# Patient Record
Sex: FEMALE | Race: WHITE
[De-identification: names, ages, dates, MRNs, and addresses within clinical notes are randomized per-mention and may not be internally consistent; named-entity substitution may affect disease eponyms.]

---

## 2017-09-07 NOTE — ED
Altered Mental Status HPI





- General


Chief Complaint: Altered Mental Status


Stated Complaint: fluid on lungs-sent by 


Time Seen by Provider: 09/07/17 16:47


Source: patient


Mode of arrival: wheelchair


Limitations: no limitations





- History of Present Illness


Initial Comments: 





Patient presents with altered mental status.  She has a history of electrolyte 

abnormalities and the past.  She has had problems with her sodium.  Patient has 

no belly or back pain.  She has no chest pain or shortness of breath.  She has 

no lightheadedness, but does feel weak and dizzy.  Nothing makes her symptoms 

better or worse.  She was not doing anything when she began to feel this way.  

She denies injuries.  She has no palpitations.  She has no pain or swelling in 

the legs.





- Related Data


 Home Medications











 Medication  Instructions  Recorded  Confirmed


 


Albuterol Nebulized [Ventolin 2.5 mg INHALATION RT-Q4H PRN 07/08/15 09/07/17





Nebulized]   


 


Losartan [Cozaar] 50 mg PO DAILY 07/08/15 09/07/17


 


OXcarbazepine [Trileptal] 600 mg PO BID 07/08/15 09/07/17


 


Omeprazole [PriLOSEC] 20 mg PO BID 07/08/15 09/07/17


 


ALPRAZolam [Xanax] 0.25 mg PO TID 07/10/15 09/07/17


 


Albuterol Inhaler [Ventolin Hfa 1 - 2 puff INHALATION RT-Q6H PRN 09/07/17 09/07/ 17





Inhaler]   


 


Aspirin EC [Ecotrin] 325 mg PO DAILY 09/07/17 09/07/17


 


Gabapentin 800 mg PO TID 09/07/17 09/07/17


 


Glycopyrrolate/Formoterol Fum 2 puff INHALATION RT-BID 09/07/17 09/07/17





[Bevespi Aerosphere Inhaler]   


 


HYDROmorphone HCL [Dilaudid] 4 mg PO Q4H 09/07/17 09/07/17


 


Melatonin(Unknown Dose) 1 tab PO HS 09/07/17 09/07/17


 


Multivit-Min/Iron/Folic/Lutein 1 tab PO DAILY 09/07/17 09/07/17





[Centrum Silver Women Tablet]   


 


Promethazine 6.25MG/5Ml [Phenergan 6.25 mg PO AC-TID 09/07/17 09/07/17





Syrup]   


 


SILVER sulfADIAZINE Cream 1 applic TOPICAL BID 09/07/17 09/07/17





[Silvadene 1% Cream]   


 


Simvastatin [Zocor] 20 mg PO DAILY 09/07/17 09/07/17


 


Sodium Chloride Tab 1.5 gm PO DAILY 09/07/17 09/07/17


 


guaiFENesin [Mucinex] 600 mg PO Q12H 09/07/17 09/07/17








 Previous Rx's











 Medication  Instructions  Recorded


 


Carvedilol [Coreg*] 25 mg PO BID-W/MEALS #60 tab 07/13/15











 Allergies











Allergy/AdvReac Type Severity Reaction Status Date / Time


 


codeine Allergy  Dyspnea, Verified 09/07/17 16:14





   lip  





   swelling  


 


wheat Allergy  Swelling Verified 09/07/17 16:14














Review of Systems


ROS Statement: 


Those systems with pertinent positive or pertinent negative responses have been 

documented in the HPI.





ROS Other: All systems not noted in ROS Statement are negative.





Past Medical History


Past Medical History: Asthma, Chest Pain / Angina, CVA/TIA, GERD/Reflux, 

Hyperlipidemia, Hypertension, Osteoarthritis (OA), Seizure Disorder


Additional Past Medical History / Comment(s): recent anemia-transfused last 

month, SOB, dizziness, hx. hiatal hernia, herniated discs back, TIA years ago, 

last seizure 8 yrs. ago


History of Any Multi-Drug Resistant Organisms: None Reported


Past Surgical History: Heart Catheterization, Hysterectomy


Additional Past Surgical History / Comment(s): fatty tumor removed back, hole 

in heart repaired w/"patch"


Past Anesthesia/Blood Transfusion Reactions: Postoperative Nausea & Vomiting (

PONV)


Past Psychological History: Depression


Smoking Status: Never smoker


Past Alcohol Use History: None Reported


Past Drug Use History: None Reported





- Past Family History


  ** Mother


Family Medical History: Coronary Artery Disease (CAD)


Additional Family Medical History / Comment(s): heart problems





General Exam


Limitations: no limitations


General appearance: alert, in no apparent distress


Head exam: Present: atraumatic, normocephalic, normal inspection


Eye exam: Present: normal appearance, PERRL, EOMI.  Absent: scleral icterus, 

conjunctival injection, periorbital swelling


ENT exam: Present: normal exam, mucous membranes moist


Neck exam: Present: normal inspection.  Absent: tenderness, meningismus, 

lymphadenopathy


Respiratory exam: Present: normal lung sounds bilaterally.  Absent: respiratory 

distress, wheezes, rales, rhonchi, stridor


Cardiovascular Exam: Present: regular rate, normal rhythm, normal heart sounds.

  Absent: systolic murmur, diastolic murmur, rubs, gallop, clicks


GI/Abdominal exam: Present: soft, normal bowel sounds.  Absent: distended, 

tenderness, guarding, rebound, rigid


Extremities exam: Present: normal inspection, full ROM, normal capillary 

refill.  Absent: tenderness, pedal edema, joint swelling, calf tenderness


Back exam: Present: normal inspection


Neurological exam: Present: alert, oriented X3, CN II-XII intact


Psychiatric exam: Present: normal affect, normal mood


Skin exam: Present: warm, dry, intact, normal color.  Absent: rash





Course





 Vital Signs











  09/07/17 09/07/17 09/07/17





  15:23 16:57 17:39


 


Temperature 98.2 F  


 


Pulse Rate 67 55 L 55 L


 


Respiratory 18 18 18





Rate   


 


Blood Pressure 142/64 154/76 168/77


 


O2 Sat by Pulse 97 100 99





Oximetry   














  09/07/17 09/07/17





  18:04 18:42


 


Temperature  


 


Pulse Rate 53 L 59 L


 


Respiratory 18 18





Rate  


 


Blood Pressure 166/94 162/110


 


O2 Sat by Pulse 98 100





Oximetry  














Medical Decision Making





- Medical Decision Making





patient presents not feeling well.  Examination reveals a very low sodium.  I 

have given her IV normal saline.  She will be admitted to the hospital.





- Lab Data


Result diagrams: 


 09/07/17 17:40





 09/07/17 17:40





 Lab Results











  09/07/17 09/07/17 09/07/17 Range/Units





  16:40 17:40 17:40 


 


WBC   5.3   (3.8-10.6)  k/uL


 


RBC   3.82   (3.80-5.40)  m/uL


 


Hgb   10.1 L   (11.4-16.0)  gm/dL


 


Hct   32.0 L   (34.0-46.0)  %


 


MCV   83.7   (80.0-100.0)  fL


 


MCH   26.5   (25.0-35.0)  pg


 


MCHC   31.7   (31.0-37.0)  g/dL


 


RDW   16.0 H   (11.5-15.5)  %


 


Plt Count   254   (150-450)  k/uL


 


Neutrophils %   63   %


 


Lymphocytes %   23   %


 


Monocytes %   9   %


 


Eosinophils %   3   %


 


Basophils %   0   %


 


Neutrophils #   3.3   (1.3-7.7)  k/uL


 


Lymphocytes #   1.2   (1.0-4.8)  k/uL


 


Monocytes #   0.5   (0-1.0)  k/uL


 


Eosinophils #   0.1   (0-0.7)  k/uL


 


Basophils #   0.0   (0-0.2)  k/uL


 


Hypochromasia   Marked   


 


Anisocytosis   Slight   


 


PT    11.3  (9.0-12.0)  sec


 


INR    1.1  (<1.2)  


 


APTT    25.0  (22.0-30.0)  sec


 


Sodium     (137-145)  mmol/L


 


Potassium     (3.5-5.1)  mmol/L


 


Chloride     ()  mmol/L


 


Carbon Dioxide     (22-30)  mmol/L


 


Anion Gap     mmol/L


 


BUN     (7-17)  mg/dL


 


Creatinine     (0.52-1.04)  mg/dL


 


Est GFR (MDRD) Af Amer     (>60 ml/min/1.73 sqM)  


 


Est GFR (MDRD) Non-Af     (>60 ml/min/1.73 sqM)  


 


Glucose     (74-99)  mg/dL


 


Calcium     (8.4-10.2)  mg/dL


 


Total Bilirubin     (0.2-1.3)  mg/dL


 


AST     (14-36)  U/L


 


ALT     (9-52)  U/L


 


Alkaline Phosphatase     ()  U/L


 


Ammonia     (<30)  umol/L


 


Troponin I     (0.000-0.034)  ng/mL


 


NT-Pro-B Natriuret Pep     pg/mL


 


Total Protein     (6.3-8.2)  g/dL


 


Albumin     (3.5-5.0)  g/dL


 


Urine Color  Yellow    


 


Urine Appearance  Clear    (Clear)  


 


Urine pH  7.0    (5.0-8.0)  


 


Ur Specific Gravity  1.013    (1.001-1.035)  


 


Urine Protein  Negative    (Negative)  


 


Urine Glucose (UA)  Negative    (Negative)  


 


Urine Ketones  Negative    (Negative)  


 


Urine Blood  Negative    (Negative)  


 


Urine Nitrite  Negative    (Negative)  


 


Urine Bilirubin  Negative    (Negative)  


 


Urine Urobilinogen  <2.0    (<2.0)  mg/dL


 


Ur Leukocyte Esterase  Negative    (Negative)  














  09/07/17 09/07/17 09/07/17 Range/Units





  17:40 17:40 17:40 


 


WBC     (3.8-10.6)  k/uL


 


RBC     (3.80-5.40)  m/uL


 


Hgb     (11.4-16.0)  gm/dL


 


Hct     (34.0-46.0)  %


 


MCV     (80.0-100.0)  fL


 


MCH     (25.0-35.0)  pg


 


MCHC     (31.0-37.0)  g/dL


 


RDW     (11.5-15.5)  %


 


Plt Count     (150-450)  k/uL


 


Neutrophils %     %


 


Lymphocytes %     %


 


Monocytes %     %


 


Eosinophils %     %


 


Basophils %     %


 


Neutrophils #     (1.3-7.7)  k/uL


 


Lymphocytes #     (1.0-4.8)  k/uL


 


Monocytes #     (0-1.0)  k/uL


 


Eosinophils #     (0-0.7)  k/uL


 


Basophils #     (0-0.2)  k/uL


 


Hypochromasia     


 


Anisocytosis     


 


PT     (9.0-12.0)  sec


 


INR     (<1.2)  


 


APTT     (22.0-30.0)  sec


 


Sodium  120 L*    (137-145)  mmol/L


 


Potassium  4.5    (3.5-5.1)  mmol/L


 


Chloride  89 L    ()  mmol/L


 


Carbon Dioxide  26    (22-30)  mmol/L


 


Anion Gap  5    mmol/L


 


BUN  6 L    (7-17)  mg/dL


 


Creatinine  0.40 L    (0.52-1.04)  mg/dL


 


Est GFR (MDRD) Af Amer  >60    (>60 ml/min/1.73 sqM)  


 


Est GFR (MDRD) Non-Af  >60    (>60 ml/min/1.73 sqM)  


 


Glucose  90    (74-99)  mg/dL


 


Calcium  8.5    (8.4-10.2)  mg/dL


 


Total Bilirubin  0.4    (0.2-1.3)  mg/dL


 


AST  14    (14-36)  U/L


 


ALT  27    (9-52)  U/L


 


Alkaline Phosphatase  67    ()  U/L


 


Ammonia   <9   (<30)  umol/L


 


Troponin I    <0.012  (0.000-0.034)  ng/mL


 


NT-Pro-B Natriuret Pep     pg/mL


 


Total Protein  5.5 L    (6.3-8.2)  g/dL


 


Albumin  3.4 L    (3.5-5.0)  g/dL


 


Urine Color     


 


Urine Appearance     (Clear)  


 


Urine pH     (5.0-8.0)  


 


Ur Specific Gravity     (1.001-1.035)  


 


Urine Protein     (Negative)  


 


Urine Glucose (UA)     (Negative)  


 


Urine Ketones     (Negative)  


 


Urine Blood     (Negative)  


 


Urine Nitrite     (Negative)  


 


Urine Bilirubin     (Negative)  


 


Urine Urobilinogen     (<2.0)  mg/dL


 


Ur Leukocyte Esterase     (Negative)  














  09/07/17 Range/Units





  17:40 


 


WBC   (3.8-10.6)  k/uL


 


RBC   (3.80-5.40)  m/uL


 


Hgb   (11.4-16.0)  gm/dL


 


Hct   (34.0-46.0)  %


 


MCV   (80.0-100.0)  fL


 


MCH   (25.0-35.0)  pg


 


MCHC   (31.0-37.0)  g/dL


 


RDW   (11.5-15.5)  %


 


Plt Count   (150-450)  k/uL


 


Neutrophils %   %


 


Lymphocytes %   %


 


Monocytes %   %


 


Eosinophils %   %


 


Basophils %   %


 


Neutrophils #   (1.3-7.7)  k/uL


 


Lymphocytes #   (1.0-4.8)  k/uL


 


Monocytes #   (0-1.0)  k/uL


 


Eosinophils #   (0-0.7)  k/uL


 


Basophils #   (0-0.2)  k/uL


 


Hypochromasia   


 


Anisocytosis   


 


PT   (9.0-12.0)  sec


 


INR   (<1.2)  


 


APTT   (22.0-30.0)  sec


 


Sodium   (137-145)  mmol/L


 


Potassium   (3.5-5.1)  mmol/L


 


Chloride   ()  mmol/L


 


Carbon Dioxide   (22-30)  mmol/L


 


Anion Gap   mmol/L


 


BUN   (7-17)  mg/dL


 


Creatinine   (0.52-1.04)  mg/dL


 


Est GFR (MDRD) Af Amer   (>60 ml/min/1.73 sqM)  


 


Est GFR (MDRD) Non-Af   (>60 ml/min/1.73 sqM)  


 


Glucose   (74-99)  mg/dL


 


Calcium   (8.4-10.2)  mg/dL


 


Total Bilirubin   (0.2-1.3)  mg/dL


 


AST   (14-36)  U/L


 


ALT   (9-52)  U/L


 


Alkaline Phosphatase   ()  U/L


 


Ammonia   (<30)  umol/L


 


Troponin I   (0.000-0.034)  ng/mL


 


NT-Pro-B Natriuret Pep  1530  pg/mL


 


Total Protein   (6.3-8.2)  g/dL


 


Albumin   (3.5-5.0)  g/dL


 


Urine Color   


 


Urine Appearance   (Clear)  


 


Urine pH   (5.0-8.0)  


 


Ur Specific Gravity   (1.001-1.035)  


 


Urine Protein   (Negative)  


 


Urine Glucose (UA)   (Negative)  


 


Urine Ketones   (Negative)  


 


Urine Blood   (Negative)  


 


Urine Nitrite   (Negative)  


 


Urine Bilirubin   (Negative)  


 


Urine Urobilinogen   (<2.0)  mg/dL


 


Ur Leukocyte Esterase   (Negative)  














Disposition


Clinical Impression: 


 Hyponatremia





Disposition: ADMITTED AS IP TO THIS HOSP


Condition: Fair


Referrals: 


Duglas Davis MD [Primary Care Provider] - 1-2 days


Time of Disposition: 20:47

## 2017-09-07 NOTE — XR
EXAMINATION TYPE: XR chest 2V

 

DATE OF EXAM: 9/7/2017

 

COMPARISON: 7/7/2015

 

HISTORY: Altered mental status, suspicion for pulmonary edema

 

TECHNIQUE:  Frontal and lateral views of the chest are obtained.

 

FINDINGS:  There is no focal air space opacity, pleural effusion, or pneumothorax seen.  

 

The cardiac silhouette size is moderately enlarged. The thoracic aorta is tortuous. There is evidence
 of a hiatal hernia and this was also seen on the prior study. 

 

The skeletal structures are unremarkable. 

 

IMPRESSION:  

1. No acute cardiopulmonary process.

2. Moderately enlarged cardiac silhouette with tortuous thoracic aorta.

## 2017-09-09 NOTE — P.PN
Progress Note - Text


DATE OF SERVICE: 


09/09/2017





PRESENTING COMPLAINT:


Tired





HISTORY OF PRESENT ILLNESS:


Presented with low sodium, 120 on admission, with episodes of confusion. Has a 

chronic history of low sodium, followed by Dr. Daniels.





INTERVAL HISTORY:


09/09/2017


Patient sitting up in bed, feels tired but better than yesterday.  Sodium 128 

today.  0.9% normal saline added to regimen per nephrology, fluid restriction 

1200 mL's daily remains.  Sodium tabs continue as well.  Patient refused dose 

of Lasix, feels certain she is not supposed to have it.





REVIEW OF SYSTEMS:


Done for constitutional ,cardiovascular, GI, pulmonary with relevant findings 

as above.





CURRENT MEDICATIONS


Lipitor 10 mg by mouth daily, Coreg 25 mg by mouth twice a day, Lasix 20 mg by 

mouth daily, Neurontin and her milligrams by mouth 3 times a day, Cozaar 50 mg 

by mouth daily, Trileptal 600 mg by mouth twice a day





PHYSICAL EXAM


VITAL SIGNS: 


Temp temperature 97.8, pulse 69, respiratory rate 16, blood pressure 145/67, 

oxygen saturation 96% on room air.





GENERAL APPEARANCE: Lying in bed, not in distress. 


EYES: Pupils equal. Conjunctiva normal. 


NECK: JVD not raised. Mass not palpable. 


RESPIRATORY: Respiratory effort normal. Lungs clear to auscultation. 


CARDIOVASCULAR: First and second sounds normal. No edema. 


ABDOMEN: Soft. Liver and spleen not palpable. No tenderness. No mass palpable. 


PSYCHIATRY: Alert and oriented x3. Mood and affect normal. 


NEUROLOGIC: Pupils equal, weakness in both lower extremities.  





INVESTIGATIONS:


Hemoglobin 9.3, sodium 128, potassium 3.9, BUN 3 creatinine 0.43.





ASSESSMENT:


-Hypoosmolar hyponatremia likely SIADH acute on chronic symptomatic.


-Chronic obstructive asthma.


-Chronic congestive heart failure.  Ejection fraction not known.


-Gastroesophageal reflux disease.


-Hyperlipidemia.


-Essential Hypertension


-Primary osteoarthritis of multiple joints.


-History of seizure.


-Hiatal hernia.


-Herniated disc and lower back.


-Depression not otherwise specified.


-Gait dysfunction uses a wheelchair at baseline





PLAN:


Normal saline added per nephrology, continue fluid restriction of 1200 mL daily 

and no free fluid.  We'll discuss with nephrology the continuation of Lasix 

although patient refusing Lasix currently.  Discharge planning for tomorrow 

based on patient condition.  Plan of care discussed with the patient the 

bedside will follow closely.





NP statement: Patient was seen and examined by nurse practitioner Skye Sanchez and all elements of the case discussed with attending  Dr. Tuttle

## 2017-09-09 NOTE — HP
HISTORY AND PHYSICAL



DATE OF ADMISSION:

09/07/2017



PRESENTING COMPLAINT:

Tired.



HISTORY OF PRESENTING COMPLAINT:

This is a 67-year-old patient of Dr. Davis.  The patient pretty much uses a

wheelchair.  Chronic stable medical conditions include CHF, GERD, hyperlipidemia,

hypertension, osteoarthritis, seizures, hiatal hernia, herniated disc and depression.

The patient has had low sodium before and is followed by Dr. Daniels. The patient

presents with episodes of confusion. Sodium was found to be 120 and admitted for the

same. Patient a bit tired, has tolerated diet. Denies any fever.



REVIEW OF SYSTEMS:

CONSTITUTIONAL:  Tired.

HEENT: None.

RESPIRATORY: None.

CARDIOVASCULAR: None.

GASTROINTESTINAL:  None.

GENITOURINARY:  None.

MUSCULOSKELETAL:  Pains in different joints including lower back.

DERMATOLOGIC:  None.

HEMATOLOGIC: None.

LYMPHATIC: None.

PSYCHIATRY: Depression controlled.

NEUROLOGICAL:  No new focal weakness.



PAST MEDICAL HISTORY:

Asthma, congestive heart failure, stroke, GERD, hyperlipidemia, hypertension,

osteoarthritis, seizure, hiatal hernia, herniated disc in the lower back, seizures 8

years ago.



PAST SURGICAL HISTORY:

Cardiac cath, hysterectomy, fatty tumor removal from the back, hole in the heart

repaired with a patch.



SOCIAL HISTORY:

No smoking.  No alcohol.  Uses a wheelchair.



FAMILY HISTORY:

Coronary artery disease.



HOME MEDICATIONS:

1. Abilify 5 mg a day.

2. Mirtazapine 30 mg p.o. q.h.s.

3. Mucinex 600 mg p.o. q.12.

4. Sodium chloride 1.5 g p.o. daily.

5. Zocor 20 mg p.o. daily.

6. Silvadene 1% cream topical b.i.d.

7. Phenergan 6.25 mg p.o. a.c. t.i.d.

8. Prilosec 20 mg p.o. b.i.d.

9. Trileptal 600 mg b.i.d.

10.Multivitamin 1 tab p.o. daily.

11.Melatonin 1 tab p.o. q.h.s.

12.Cozaar 50 mg p.o. daily.

13.Dilaudid 4 mg p.o. q.4.

14._____  2 puffs inhalation b.i.d.

15.Gabapentin 800 mg p.o. t.i.d.

16.Coreg 25 mg p.o. b.i.d.

17.Aspirin 325 p.o. daily.

18.Ventolin 2.5 q.4h p.r.n.

19.Ventolin HFA 1-2 puffs q.6h p.r.n.

20.Xanax 0.25 p.o. t.i.d.



ALLERGIES:

CODEINE AND WHEAT.



EXAMINATION:

Vital signs on presentation: Temperature 98.2, pulse 67, respiration 18, blood pressure

122/64, pulse ox 97% room air.

GENERAL APPEARANCE:  Average built, lying in bed, tired appearing.

EYES: Pupils equal. Conjunctivae normal.

HEENT: Oral cavity normal.

NECK: JVD not raised.  Mass not palpable.

RESPIRATORY: Effort normal. Lungs are clear.

CARDIOVASCULAR:  First and second sounds normal. No edema.

ABDOMEN:  Soft, nontender.  Liver and spleen not palpable.

LYMPHATIC: No lymph node palpable in the neck or axillae.

PSYCHIATRY: Patient able answer simple questions.  Mood and affect normal.

NEUROLOGICAL: Pupils equal. Cranial nerves grossly intact.  Weakness in both lower

extremities.



INVESTIGATIONS:

White count 5.3, hemoglobin 10.1, sodium 120, potassium 4.5, serum osmolality 259.



ASSESSMENT:

1. Hyposmolar hyponatremia likely SIADH acute on chronic symptomatic.

2. _____Obstructive asthma.

3. Chronic congestive heart failure.  Ejection fraction not known.

4. Gastroesophageal reflux disease.

5. Hyperlipidemia.

6. Hypertension.

7. Primary osteoarthritis in multiple joints.

8. History of seizure.

9. Hiatal hernia.

10.Herniated disc in lower back.

11.Depression, not otherwise specified.

12.Gait dysfunction uses a wheelchair at the bedside.



PLAN:

We will increase the sodium intake to 2 g p.o. q.i.d., put the patient on fluid

restriction of 1200 mL and also to avoid free fluids. Will also put her on small dose

of Lasix of 20 mg. Home medication will be resumed. Patient is known to Dr. Goodwin.

Will consult him.  Care was discussed with the patient.  Questions were answered.





MMODL / IJN: [id number] / Job#: [id number]

## 2017-09-09 NOTE — CONS
CONSULTATION



REASON FOR CONSULT:

Hyponatremia.



HISTORY OF PRESENT ILLNESS:

The patient is a 67-year-old female who has a history of chronic hyponatremia.  She

follows with us as outpatient.  Her sodium has been staying at about 129 to 128 as

outpatient.  The patient has been maintained on fluid restriction.  She was admitted to

the hospital with complaints of weakness, not feeling well.  She had not been eating

much.  She also had diarrhea prior to admission.  Serum sodium was noted to be at 120

when patient initially came in.  She is maintained on sodium chloride tabs, which is

quite a high dose at 2 grams q.i.d.  Her sodium level went up to 126 yesterday and this

morning it is at 128.  The patient states overall she is feeling better.  She is not

having any further diarrhea.



PAST MEDICAL HISTORY:

Chronic hyponatremia, hypertension, gastroesophageal reflux disease, asthma,

dyslipidemia, neuropathy, history of CVA/TIA, seizure disorder, depression.



PAST SURGICAL HISTORY:

Cardiac catheterization, hysterectomy, removal of fatty tumor on the back, heart

surgery for repair of defect possibly of VSD, details not clear.



SOCIAL HISTORY:

Negative for smoking, drug abuse or alcohol abuse.



REVIEW OF SYSTEMS:

As per HPI, other systems negative.



MEDICATIONS:

Medications at home included Cozaar, Trileptal, Prilosec, Xanax, inhalers, aspirin,

gabapentin, Zocor, sodium chloride tabs, Mucinex, Coreg.



ALLERGIES:

Allergies include CODEINE, WHEAT.



PHYSICAL EXAMINATION:

On examination, patient is comfortable, awake.  She is not in any acute distress.  She

is alert, oriented x3.  Blood pressure is 145/67, heart rate 68 per minute. Patient is

afebrile.

EXAMINATION OF THE HEART: S1, S2.

EXAMINATION OF THE LUNGS: Bilateral breath sounds are heard.

ABDOMEN:  Soft, nontender.

Examination of the lower extremity shows no significant edema.

CNS exam is grossly intact.



LABS:

Labs show sodium 128, potassium 3.9. Hemoglobin 9.3 grams/dL.



ASSESSMENT:

1. Euvolemic hyponatremia with initially possibly hypovolemic as patient had decreased

    intake and diarrhea prior to admission.  She is maintained on sodium chloride

    tablets, which we will continue.  However, they may be a prerenal component and I

    will challenge her with normal saline.  We will repeat serum sodium in about 3

    hours and proceed from there.  In the meantime, she can continue with the sodium

    chloride tabs, although it is quite a big dose.  The urine osmolality has been

    ordered and it may not be accurate given the high amount of sodium load being taken

    orally.

2. Hypertension, currently controlled.

3. Coronary artery disease with previous history of cardiac catheterization.

4. Congestive heart failure, ejection fraction not known.



PLAN:

Challenge with the normal saline.  Repeat labs in about 3 hours and will proceed from

there.  Continue with the sodium chloride tabs for now.  We may need to decrease the

dose as it is a significantly high dose.



Thank you for this consultation.  We will continue to follow the patient with you

during her hospitalization.





MMODL / IJN: [id number] / Job#: [id number]

## 2017-09-10 NOTE — PN
PROGRESS NOTE



DATE OF SERVICE:

09/10/2017



Patient is seen for followup for hyponatremia.  She was admitted to the hospital with a

serum sodium of 120, it improved to 126 and 128.  The patient's oral sodium chloride

tablets were increased.  I started her on saline yesterday and this morning her serum

sodium is at 129. Patient remains on oral sodium chloride tablets.  However, she is

complaining of mild chest discomfort.  She denies any shortness of breath.  Chest x-ray

on this admission showed no acute cardiopulmonary process.  Her sodium this morning is

up to 129 mEq/L.  The patient denies any other complaints.



EXAMINATION:

Patient is comfortable.  Blood pressure is 137/74, heart rate 67 per minute. She is

afebrile.  Examination of the heart: S1, S2.  Examination lungs: Bilateral breath

sounds are heard.  ABDOMEN:  Soft, nontender.  Exam lower extremity shows edema 1+

bilaterally.  CNS exam is grossly intact.



LAB:

Shows sodium 129, potassium 4.3, BUN 5, serum creatinine 0.4.



ASSESSMENT:

1. Euvolemic hyponatremia with elevated urine osmolality which could also be secondary

    to the high salt intake.  The patient did not worsen with the normal saline.

    However, she does have lower extremity edema.  I will discontinue the saline. We

    may continue with the sodium chloride tablets, but I will decrease the dose to 2 g

    b.i.d.  We will repeat another serum sodium level this evening and patient can most

    likely be discharged tomorrow.  I do not see a cortisol level in her workup, which

    I doubt clinically that there is any underlying adrenal insufficiency, but I will

    order it to complete the workup.

2. Hypertension, controlled.

3. Coronary artery disease with a previous history of cardiac catheterization.



PLAN:

Discontinue normal saline.  Decrease sodium chloride tablets.  Lasix x1 and repeat

chest x-ray and repeat serum sodium this evening.



MMODL / IJN: [id number] / Job#: [id number]

## 2017-09-10 NOTE — XR
EXAMINATION TYPE: XR chest 1V

 

DATE OF EXAM: 9/10/2017

 

COMPARISON: 9/7/2017

 

HISTORY: 67 year-old female shortness of breath, CHF

 

TECHNIQUE: Single frontal view of the chest is obtained.

 

FINDINGS:  

The heart is mildly moderately enlarged. Mild elongation of the thoracic aorta. Diffuse interstitial 
prominence. No consolidation or pleural effusion.

 

IMPRESSION:  

Mild-to-moderate cardiomegaly and interstitial prominence. Mild CHF may be present. No pulmonary kishan
a.

## 2017-09-10 NOTE — PN
PROGRESS NOTE



DATE OF SERVICE:

09/09/2017



ATTENDING NOTE:

This patient seen and examined by me.  I discussed this with my nurse practitioner Ms. Sanchez.  Patient admitted with severe hyponatremia, feeling better.  Sodium has come

up to 128. Being followed by Nephrology.



EXAMINATION:

Blood pressure 137/75.

LUNGS:  Clear.



INVESTIGATION:

Sodium 128.



ASSESSMENT:

Hyposmolar hyponatremia likely SIADH, acute on chronic, slowly improving.



PLAN:

Continue with fluid restriction and free fluid restriction too. Getting IV fluids

ordered by Nephrology. Lasix has been discontinued.  Follow.





MMODL / IJN: [id number] / Job#: [id number]

## 2017-09-10 NOTE — P.PN
Progress Note - Text


DATE OF SERVICE: 


 09/20/2017





PRESENTING COMPLAINT:


Tired





HISTORY OF PRESENT ILLNESS:


Presented with hyponatremia, sodium 120 on admission, with episodes of 

confusion. Has a chronic history of low sodium, followed by Dr. Daniels.





INTERVAL HISTORY:


09/10/2017:


Sitting up at the edge of the bed, eating her breakfast, continues to feel 

tired but better than yesterday, no lethargy, no confusion, no dizziness.  

Serum sodium 129 today.  Normal saline discontinued, sodium chloride tablets 

decreased, Lasix 1 dose with a repeat serum sodium this evening per 

nephrology.  Tolerating her diet eating about 50% of her breakfast, gets around 

in a wheelchair is able to help quite a bit with transfers and getting from 

place to place.  Last BM 09/10/2017.





09/20/2017:


Patient sitting up in bed, feels tired but better than yesterday.  Sodium 128 

today.  0.9% normal saline added to regimen per nephrology, fluid restriction 

1200 mL's daily remains.  Sodium tabs continue as well.  Patient refused dose 

of Lasix, feels certain she is not supposed to have it.





REVIEW OF SYSTEMS:


Done for constitutional ,cardiovascular, GI, pulmonary, neurologic with 

relevant findings as above.





CURRENT MEDICATIONS


Lipitor 10 mg by mouth daily, Coreg 25 mg by mouth twice a day, Lasix 20 mg by 

mouth daily, Neurontin and her milligrams by mouth 3 times a day, Cozaar 50 mg 

by mouth daily, Trileptal 600 mg by mouth twice a day





PHYSICAL EXAM


VITAL SIGNS: 


Temperature 97.0, pulse 67, respirations 16, blood pressure 137/74, oxygen 

saturation 99% on room air.





GENERAL APPEARANCE: Sitting up on the edge of the bed, appears comfortable. 


EYES: Pupils equal. Conjunctiva normal. 


NECK: JVD not raised. Mass not palpable. 


RESPIRATORY: Respiratory effort normal. Lungs clear to auscultation. 


CARDIOVASCULAR: First and second sounds normal. No edema. 


ABDOMEN: Soft. Liver and spleen not palpable. No tenderness. No mass palpable. 


PSYCHIATRY: Alert and oriented x3. Mood and affect normal. 


NEUROLOGIC: Pupils equal, weakness in both lower extremities.  





INVESTIGATIONS:


Sodium 129, BUN 5 creatinine 0.40.





ASSESSMENT:


-Hypoosmolar hyponatremia likely SIADH acute on chronic symptomatic, slowly 

improving


-Chronic obstructive asthma.


-Chronic congestive heart failure.  Ejection fraction not known.


-Gastroesophageal reflux disease.


-Hyperlipidemia.


-Essential Hypertension


-Primary osteoarthritis of multiple joints.


-History of seizure.


-Hiatal hernia.


-Herniated disc and lower back.


-Depression not otherwise specified.


-Gait dysfunction uses a wheelchair at baseline





PLAN:


IV fluids stopped, oral sodium tabs decreased to 2 g, Lasix 20 mg 1 dose given 

repeat labs this evening as well as chest x-ray.  Patient will likely discharge 

tomorrow, will follow closely.  Plan of care discussed with the patient the 

bedside she is in agreement.


NP statement: Patient was seen and examined by nurse practitioner Skye Sanchez and all elements of the case discussed with attending  Dr. Tuttle

## 2017-09-11 NOTE — PN
PROGRESS NOTE



Patient is seen for followup for hyponatremia.  Her serum sodium has improved.  Patient

had 1 dose of Lasix yesterday secondary to increased edema.  The salt tablets were also

decreased.  Patient is actually being discharged today.



PHYSICAL EXAMINATION:

Blood pressure is 148/77, heart rate 63 per minute.  She is afebrile.

EXAMINATION OF THE HEART: S1, S2.

EXAMINATION OF LUNGS: Bilateral breath sounds are heard.

ABDOMEN:  Soft, non-tender.

Examination of lower extremities shows trace edema bilaterally.



LABS:

Serum sodium 132, serum creatinine 0.45.



ASSESSMENT:

1. Euvolemic hyponatremia, currently maintained on sodium chloride tabs, which I will

    decrease. The patient had 1 dose of Lasix yesterday.  We will repeat another dose

    of Lasix today.  She can be discharged on sodium chloride 1 gram b.i.d. We will see

    her for followup as outpatient.  The patient is advised that she may need low-dose

    Lasix depending on her volume status and serum sodium levels as outpatient.

2. Hypertension, possibly related to sodium load, currently improved. I will decrease

    the sodium chloride as well.

3. Chronic obstructive pulmonary disease.



PLAN:

Okay for discharge.  Follow up as outpatient.





MMODL / IJN: [id number] / Job#: [id number]

## 2017-09-11 NOTE — P.DS
Providers


Date of admission: 


09/07/17 20:48





Expected date of discharge: 09/11/17


Attending physician: 


Antonio Tuttle





Consults: 





 





09/08/17 12:07


Consult Physician Routine 


   Consulting Provider: Son Goodwin


   Consult Reason/Comments: low sodium


   Do you want consulting provider notified?: Yes











Primary care physician: 


Duglas HAMMOND [facility] Course: 








FINAL DIAGNOSES:


-Hypoosmolar hyponatremia likely SIADH acute on chronic symptomatic, slowly 

improving


-Chronic obstructive asthma.


-Chronic congestive heart failure.  Ejection fraction not known.


-Gastroesophageal reflux disease.


-Hyperlipidemia.


-Essential Hypertension


-Primary osteoarthritis of multiple joints.


-History of seizure.


-Hiatal hernia.


-Herniated disc and lower back.


-Depression not otherwise specified.


-Gait dysfunction uses a wheelchair at baseline





HOSPTIAL COURSE:


67-year-old female with a chronic history of low sodium followed by Dr. Daniels, 

presented with acute confusion sodium was found to be 120 admitted for the 

same.  Nephrology was consulted, continued sodium tabs, gave her normal saline 

challenge jose labs, and a urine osmolality.  Patient developed lower extremity 

edema due to the normal saline challenge, normal saline was discontinued, given 

a dose of IV Lasix to eliminate the excess fluid, sodium tabs increased to 2 g 

twice a day with more labs to be drawn to evaluate progress.  Sodium continued 

to improve, today 132, sodium tablets readjusted to her home dosing.  Patient's 

confusion has dissipated completely, sitting up in the bed, alert and very 

talkative.  Tolerating her diet eating 50-75% of each meal, at baseline patient 

uses a wheelchair to get about, and can do that with some standby assistance to 

get into the chair, last BM was 09/10/2017.  Overall condition has improved, 

cleared by consultants and is appropriate for discharge home.








PHYSICAL EXAM: 





CARDIOVASCULAR: First and second sounds noted mild edema to feet and ankles





RESPIRATORY: Effort normal lung sounds diminished bilaterally





MUSKULOSKELETAL: Lower extremity weakness, chronic, uses a wheelchair at 

baseline





PSYCHIATRY: Alert and oriented 3 mood and affect normal.





Patient was seen and examined by nurse practitioner Skye Sanchez in all 

elements of the case discussed with attending Dr. Tuttle


DISPOSITION: Discharge home to the care of her .








Patient Condition at Discharge: Fair





Plan - Discharge Summary


New Discharge Prescriptions: 


New


   Aspirin 81 mg PO DAILY #1 chewable


   Furosemide [Lasix] 20 mg PO DAILY #30 tab





Continue


   Omeprazole [PriLOSEC] 20 mg PO BID


   OXcarbazepine [Trileptal] 600 mg PO BID


   Losartan [Cozaar] 50 mg PO DAILY


   Albuterol Nebulized [Ventolin Nebulized] 2.5 mg INHALATION RT-Q4H PRN


     PRN Reason: Shortness Of Breath


   ALPRAZolam [Xanax] 0.25 mg PO TID


   Carvedilol [Coreg*] 25 mg PO BID-W/MEALS #60 tab


   guaiFENesin [Mucinex] 600 mg PO Q12H


   SILVER sulfADIAZINE Cream [Silvadene 1% Cream] 1 applic TOPICAL BID


   Melatonin(Unknown Dose) 1 tab PO HS


   Promethazine 6.25MG/5Ml [Phenergan Syrup] 6.25 mg PO AC-TID


   Gabapentin 800 mg PO TID


   HYDROmorphone HCL [Dilaudid] 4 mg PO Q4H


   Glycopyrrolate/Formoterol Fum [Bevespi Aerosphere Inhaler] 2 puff INHALATION 

RT-BID


   Simvastatin [Zocor] 20 mg PO DAILY


   Multivit-Min/Iron/Folic/Lutein [Centrum Silver Women Tablet] 1 tab PO DAILY


   Albuterol Inhaler [Ventolin Hfa Inhaler] 1 - 2 puff INHALATION RT-Q6H PRN


     PRN Reason: Shortness Of Breath


   Mirtazapine 30 mg PO HS


   ARIPiprazole [Abilify] 5 mg PO QAM





Changed


   Sodium Chloride Tab 1.5 gm PO BID #60 





Discontinued


   Aspirin EC [Ecotrin] 325 mg PO DAILY


Discharge Medication List





Albuterol Nebulized [Ventolin Nebulized] 2.5 mg INHALATION RT-Q4H PRN 07/08/15 [

History]


Losartan [Cozaar] 50 mg PO DAILY 07/08/15 [History]


OXcarbazepine [Trileptal] 600 mg PO BID 07/08/15 [History]


Omeprazole [PriLOSEC] 20 mg PO BID 07/08/15 [History]


ALPRAZolam [Xanax] 0.25 mg PO TID 07/10/15 [History]


Carvedilol [Coreg*] 25 mg PO BID-W/MEALS #60 tab 07/13/15 [Rx]


ARIPiprazole [Abilify] 5 mg PO QAM 09/07/17 [History]


Albuterol Inhaler [Ventolin Hfa Inhaler] 1 - 2 puff INHALATION RT-Q6H PRN 09/07/ 17 [History]


Gabapentin 800 mg PO TID 09/07/17 [History]


Glycopyrrolate/Formoterol Fum [Bevespi Aerosphere Inhaler] 2 puff INHALATION RT-

BID 09/07/17 [History]


HYDROmorphone HCL [Dilaudid] 4 mg PO Q4H 09/07/17 [History]


Melatonin(Unknown Dose) 1 tab PO HS 09/07/17 [History]


Mirtazapine 30 mg PO HS 09/07/17 [History]


Multivit-Min/Iron/Folic/Lutein [Centrum Silver Women Tablet] 1 tab PO DAILY 09/ 07/17 [History]


Promethazine 6.25MG/5Ml [Phenergan Syrup] 6.25 mg PO AC-TID 09/07/17 [History]


SILVER sulfADIAZINE Cream [Silvadene 1% Cream] 1 applic TOPICAL BID 09/07/17 [

History]


Simvastatin [Zocor] 20 mg PO DAILY 09/07/17 [History]


guaiFENesin [Mucinex] 600 mg PO Q12H 09/07/17 [History]


Aspirin 81 mg PO DAILY #1 chewable 09/11/17 [Rx]


Furosemide [Lasix] 20 mg PO DAILY #30 tab 09/11/17 [Rx]


Sodium Chloride Tab 1.5 gm PO BID #60  09/11/17 [Rx]








Follow up Appointment(s)/Referral(s): 


Aleksandra Daniels MD [STAFF PHYSICIAN] - 1 Week (Dr. Daniels's office will call 

patient with a follow up appointment. )


Duglas Davis MD [Primary Care Provider] - 09/12/17 2:00 pm


Ambulatory/Diagnostic Orders: 


Basic Metabolic Panel [LAB.AMB] Location: Determined By Patient


Basic Metabolic Panel [LAB.AMB] Location: Determined By Patient


Patient Instructions/Handouts:  Furosemide (By mouth), Aspirin (By mouth), 

Sodium Chloride (By mouth), Hyponatremia (DC)


Activity/Diet/Wound Care/Special Instructions: 


residential home care - 





Diet: Heart Healthy


Activity: as tolerated





* Sodium Level redrawn in three days*


Discharge Disposition: HOME SELF-CARE

## 2017-09-13 NOTE — DS
DISCHARGE SUMMARY



ADDENDUM:



DATE OF DISCHARGE:

September 11, 2017.



ATTENDING NOTE:

This patient was seen and examined by me.  I discussed with my nurse practitioner, Ms. Sanchez.



EXAM:

Lungs decreased breath sounds. Cardiovascular first and second sounds normal.



The patient's sodium is 132.



ASSESSMENT:

Syndrome of inappropriate antidiuretic hormone  with severe hypoosmolar hyponatremia,

improving.



PLAN:

Patient will be discharged.



DISCHARGE MEDICATION:

Will include:

1. Lasix 20 mg.

2. Sodium chloride tab will be 1.5 mg p.o. b.i.d.

Fluid restriction to continue.  The patient to follow up with Dr. Daniels and labs as an

outpatient.  Discharge planning more than 35 minutes.





MMODL / IJN: [id number] / Job#: [id number]

## 2017-09-14 NOTE — ED
Chest Pain HPI





- General


Chief Complaint: Chest Pain


Stated Complaint: HTN


Time Seen by Provider: 17 22:23


Source: patient


Mode of arrival: ambulatory





- History of Present Illness


Initial Comments: 





This patient is a 67-year-old woman who presents to be evaluate for chest pain 

that is been going on since this afternoon, around 4 PM.  The patient states 

that she has for the most for been resting all day as she is not feeling back 

to her usual self yet from her hospital admission.  The patient had been 

admitted here with she states congestive heart failure and hyponatremia.  She 

was discharged 3 days ago.  Patient states that in addition to the chest pain 

she states that when she was getting ready for bed tonight she noticed her 

blood pressure was in the neighborhood of 190/100.  She states this really 

concerned her so she took a dose of carvedilol and came here to be evaluated.  

Patient denies any anginal type symptoms, including no dyspnea, diaphoresis, 

palpitations, lightheadedness or syncope, abdominal pain, nausea or vomiting.


MD Complaint: chest pain


Onset/Timin


-: hour(s)


Onset: during rest


Pain Location: substernal


Pain Radiation: none


Quality: dull


Consistency: constant


Improves With: nothing


Worsens With: nothing


Treatments Prior to Arrival: none





- Related Data


 Home Medications











 Medication  Instructions  Recorded  Confirmed


 


Albuterol Nebulized [Ventolin 2.5 mg INHALATION RT-Q4H PRN 07/08/15 09/14/17





Nebulized]   


 


Losartan [Cozaar] 50 mg PO DAILY 07/08/15 09/14/17


 


OXcarbazepine [Trileptal] 600 mg PO BID 07/08/15 09/14/17


 


Omeprazole [PriLOSEC] 20 mg PO BID 07/08/15 09/14/17


 


ALPRAZolam [Xanax] 0.25 mg PO TID 07/10/15 09/14/17


 


ARIPiprazole [Abilify] 5 mg PO QAM 17


 


Albuterol Inhaler [Ventolin Hfa 1 - 2 puff INHALATION RT-Q6H PRN 17





Inhaler]   


 


Gabapentin 800 mg PO TID 17


 


Glycopyrrolate/Formoterol Fum 2 puff INHALATION RT-BID 17





[Bevespi Aerosphere Inhaler]   


 


HYDROmorphone HCL [Dilaudid] 4 mg PO Q4H 17


 


Mirtazapine 30 mg PO DAILY@17


 


Promethazine 6.25MG/5Ml [Phenergan 6.25 mg PO AC-TID 17





Syrup]   


 


SILVER sulfADIAZINE Cream 1 applic TOPICAL BID 17





[Silvadene 1% Cream]   


 


Simvastatin [Zocor] 20 mg PO DAILY 17


 


guaiFENesin [Mucinex] 600 mg PO Q12H 17


 


Carvedilol [Coreg] 25 mg PO BID-W/MEALS 17


 


Melatonin 3 mg PO HS 17


 


Multivitamins, Thera [Multivitamin 1 tab PO DAILY 17





(formulary)]   








 Previous Rx's











 Medication  Instructions  Recorded


 


Aspirin 81 mg PO DAILY #1 chewable 17


 


Furosemide [Lasix] 20 mg PO DAILY #30 tab 17


 


Sodium Chloride Tab 1.5 gm PO BID #60  17











 Allergies











Allergy/AdvReac Type Severity Reaction Status Date / Time


 


codeine Allergy  Dyspnea, Verified 17 23:18





   Lip  





   Swelling  


 


wheat Allergy  Swelling Verified 17 22:07














Review of Systems


ROS Statement: 


Those systems with pertinent positive or pertinent negative responses have been 

documented in the HPI.





ROS Other: All systems not noted in ROS Statement are negative.


Constitutional: Denies: fever, chills


Respiratory: Denies: cough, dyspnea, wheezes


Cardiovascular: Reports: chest pain, edema (Chronic).  Denies: palpitations, 

dyspnea on exertion, syncope


Gastrointestinal: Denies: abdominal pain, nausea, vomiting


Genitourinary: Denies: dysuria, hematuria


Musculoskeletal: Denies: back pain


Skin: Denies: rash


Neurological: Denies: headache, weakness, numbness





Past Medical History


Past Medical History: Asthma, Chest Pain / Angina, Heart Failure, CVA/TIA, GERD/

Reflux, Hyperlipidemia, Hypertension, Osteoarthritis (OA), Seizure Disorder


Additional Past Medical History / Comment(s): recent anemia-transfused last 

month, SOB, dizziness, hx. hiatal hernia, herniated discs back, TIA years ago, 

last seizure 8 yrs. ago


History of Any Multi-Drug Resistant Organisms: None Reported


Past Surgical History: Heart Catheterization, Hysterectomy


Additional Past Surgical History / Comment(s): fatty tumor removed back, hole 

in heart repaired w/"patch"


Past Anesthesia/Blood Transfusion Reactions: Postoperative Nausea & Vomiting (

PONV)


Past Psychological History: Depression


Smoking Status: Never smoker


Past Alcohol Use History: None Reported


Past Drug Use History: None Reported





- Past Family History


  ** Mother


Family Medical History: Coronary Artery Disease (CAD)


Additional Family Medical History / Comment(s): heart problems





General Exam


General appearance: alert, in no apparent distress, obese


Head exam: Present: atraumatic, normocephalic


Eye exam: Present: normal appearance.  Absent: scleral icterus, conjunctival 

injection


Neck exam: Present: normal inspection, full ROM


Respiratory exam: Present: rales (Bilateral bases).  Absent: normal lung sounds 

bilaterally, respiratory distress, wheezes, rhonchi, stridor


Cardiovascular Exam: Present: regular rate, normal rhythm, normal heart sounds.

  Absent: systolic murmur, diastolic murmur, rubs, gallop


GI/Abdominal exam: Present: soft.  Absent: distended, tenderness, guarding, 

rebound, mass


Extremities exam: Present: normal inspection, normal capillary refill.  Absent: 

pedal edema, calf tenderness


Back exam: Present: normal inspection.  Absent: CVA tenderness (R), CVA 

tenderness (L)


Neurological exam: Present: alert


Skin exam: Present: warm, dry, intact, normal color.  Absent: rash





Course


 Vital Signs











  09/14/17 09/14/17 09/15/17





  22:04 23:24 00:04


 


Temperature 97.3 F L  


 


Pulse Rate  59 L 61


 


Pulse Rate [   





Right Pulse   





Oximetery]   


 


Respiratory 65 H 18 18





Rate   


 


Blood Pressure 207/103 219/98 203/95


 


Blood Pressure   





[Left Arm]   


 


O2 Sat by Pulse 98 100 100





Oximetry   














  09/15/17 09/15/17 09/15/17





  00:56 00:57 01:45


 


Temperature  97.7 F 


 


Pulse Rate 56 L  53 L


 


Pulse Rate [   





Right Pulse   





Oximetery]   


 


Respiratory 18  18





Rate   


 


Blood Pressure 199/84  196/81


 


Blood Pressure   





[Left Arm]   


 


O2 Sat by Pulse 100  100





Oximetry   














  09/15/17 09/15/17 09/15/17





  02:25 02:39 03:23


 


Temperature   98.1 F


 


Pulse Rate 52 L 57 L 


 


Pulse Rate [   55 L





Right Pulse   





Oximetery]   


 


Respiratory 18 18 18





Rate   


 


Blood Pressure 200/87 189/81 


 


Blood Pressure   184/91





[Left Arm]   


 


O2 Sat by Pulse 100 100 98





Oximetry   














  09/15/17 09/15/17 09/15/17





  04:00 04:44 04:54


 


Temperature   


 


Pulse Rate  55 L 56 L


 


Pulse Rate [ 51 L  





Right Pulse   





Oximetery]   


 


Respiratory 18  





Rate   


 


Blood Pressure   


 


Blood Pressure   





[Left Arm]   


 


O2 Sat by Pulse   





Oximetry   














Disposition


Clinical Impression: 


 Chest pain, Hyponatremia





Disposition: ADMITTED AS IP TO THIS HOSP


Condition: Poor


Referrals: 


Duglas Davis MD [Primary Care Provider] - 1-2 days

## 2017-09-14 NOTE — XR
EXAM:

  XR Chest, 1 View

 

CLINICAL HISTORY:

  Reason: chest pain

 

TECHNIQUE:

  Frontal view of the chest.

 

COMPARISON:

  Chest x-ray 9/10/17

 

FINDINGS:

  Lungs:  Stable mild central pulmonary vascular congestion.  No focal 

consolidation.

  Pleural space:  No pleural effusion.  No pneumothorax.

  Heart:  Stable cardiomegaly.

  Mediastinum:  Unremarkable.

 

IMPRESSION:     

  Stable cardiomegaly and mild central pulmonary vascular congestion.

## 2017-09-15 NOTE — CT
EXAM:

  CT Angiography Chest With Intravenous Contrast

 

CLINICAL HISTORY:

  Reason: Pain

 

TECHNIQUE:

  Axial computed tomographic angiography images of the chest with 

intravenous contrast using pulmonary embolism protocol.  Coronal and 

sagittal reformats were obtained.  CTDI is 142.40 mGy and DLP is 653.30 

mGy-cm.  This CT exam was performed using one or more of the following 

dose reduction techniques: automated exposure control, adjustment of the 

mA and/or kV according to patient size, and/or use of iterative 

reconstruction technique.

  MIP reconstructed images were created and reviewed.

 

COMPARISON:

  Chest x-ray 9/14/17

 

FINDINGS:

  Pulmonary arteries:  No pulmonary embolism detected.

  Aorta:  Fusiform ascending thoracic aortic aneurysm, 4.2 cm in diameter.

  No evidence of rupture.

  Lungs:  Mild interlobular septal thickening, suggesting mild pulmonary 

edema.  Reflux of contrast into the IVC and hepatic veins, also 

suggesting right heart congestion.  Posterior right lower lobe opacities, 

likely atelectasis but correlate for pneumonia.  Several areas of mucous 

filling within bilateral lower lobe bronchi.

  Pleural space:  No significant effusion.  No pneumothorax.

  Heart:  Moderate calcified atherosclerosis of the coronary arteries.  

Mild cardiomegaly.  No significant pericardial effusion.    Mediastinum:  

Moderate sized mixed paraesophageal and sliding hiatal hernia.

  Bones/joints:  Superior endplate compression deformity of the L1 

vertebral body, likely chronic, but correlate for focal tenderness.  

Several old left-sided rib fractures.  

  Lymph nodes:  No enlarged lymph nodes.

  Liver:  Hepatic cyst.

 

IMPRESSION:     

1.  No pulmonary embolism detected.

 

2.  Mild cardiogenic pulmonary edema.

 

3.  Fusiform ascending thoracic aortic aneurysm, 4.2 cm in diameter.

 

4.  Posterior right lower lobe opacities, favor atelectasis over 

pneumonia.

 

5.  Superior endplate compression deformity of the L1 vertebral body, 

likely chronic but correlate for focal tenderness.

 

6.  Moderate sized mixed paraesophageal and sliding hiatal hernia.

## 2017-09-15 NOTE — ECHOF
Referral Reason:chest pain



MEASUREMENTS

--------

HEIGHT: 165.1 cm

WEIGHT: 88.9 kg

BP: 157/96

IVSd:   1.2 cm     (0.6 - 1.1)

LVIDd:   4.5 cm     (3.9 - 5.3)

LVPWd:   1.5 cm     (0.6 - 1.1)

IVSs:   2.0 cm

LVIDs:   3.2 cm

LVPWs:   1.8 cm

LAESV Index (A-L):   56.23 ml/m

Ao Diam:   3.1 cm     (2.0 - 3.7)

AV Cusp:   2.3 cm     (1.5 - 2.6)

LA Diam:   3.7 cm     (2.7 - 3.8)

MV EXCURSION:   15.618 mm     (> 18.000)

MV EF SLOPE:   57 mm/s     (70 - 150)

EPSS:   1.5 cm

MV E Oz:   0.84 m/s

MV DecT:   248 ms

MV A Oz:   0.70 m/s

MV E/A Ratio:   1.21 

RAP:   5.00 mmHg

RVSP:   8.21 mmHg







FINDINGS

--------

Sinus rhythm.

This was a technically good study.

The left ventricular size is normal.   There is mild 

concentric left ventricular hypertrophy.   Overall left 

ventricular systolic function is normal with, an EF 

between 55 - 60 %.

The right ventricle is normal in size and function.

LA is severely dilated >40 ml/m2

The right atrium is normal in size.

The aortic valve is trileaflet, and appears 

structurally normal. No aortic stenosis or 

regurgitation.

The mitral valve leaflets are mildly thickened.   

Moderate mitral regurgitation is present.

Mild tricuspid regurgitation present.   The right 

ventricular systolic pressure, as measured by Doppler, 

is 8.21mmHg.

Pulmonic valve appears structurally normal.

The aortic root size is normal.

The inferior vena cava is mildly dilated.

The pericardium is normal.



CONCLUSIONS

--------

1. Sinus rhythm.

2. The mitral valve leaflets are mildly thickened.

3. Moderate mitral regurgitation is present.

4. Mild tricuspid regurgitation present.

5. The right ventricular systolic pressure, as measured by 

Doppler, is 8.21mmHg.

6. Pulmonic valve appears structurally normal.

7. The aortic root size is normal.

8. The inferior vena cava is mildly dilated.

9. The pericardium is normal.

10. This was a technically good study.

11. The left ventricular size is normal.

12. There is mild concentric left ventricular hypertrophy.

13. Overall left ventricular systolic function is normal 

with, an EF between 55 - 60 %.

14. The right ventricle is normal in size and function.

15. LA is severely dilated >40 ml/m2

16. The right atrium is normal in size.

17. The aortic valve is trileaflet, and appears 

structurally normal. No aortic stenosis or 

regurgitation.





SONOGRAPHER: Mikala Verdugo RDCS

## 2017-09-15 NOTE — P.HPIM
History of Present Illness


62-year-old female came in with complaints of chest pressure-like sensation, 

patient was a valid by cardiology and the patient was believed to have 

noncardiac chest pain etiology is of which is not clear at this point of time 

although her chest pain is significantly better compared to her admission.  

Patient was also comparing of shortness of breath did not give any clear 

history of orthopnea PND patient does have history of asthma as well as chronic 

diastolic dysfunction patient underwent CT angios the chest which did show up  

a thoracic aortic in his him along with some pulmonary edema probably secondary 

to chronic diastolic dysfunction with acute exacerbation patient used to be on 

Lasix in the past which was discontinued secondary to her hyponatremia.  Her 

hyponatremia was believed to be secondary to syndrome of inappropriate ADH 

secretion and was on salt tablets which were discontinued because of elevated 

blood pressure right now her hyponatremia is believed secondary to hypovolemia 

and the nephrology is recommending 20 mg of Lasix which she was given and will 

monitor sodium.  Patient has very good air entry bilateral lung fields without 

any wheezing because of which I do not believe patient has asthma exacerbation 

at this time.  Patient was started on new antihypertensive medications although 

her blood pressure significantly dropped because of which will continue her 

home antidepressive medications except for an increase in losartan and monitor 

her blood pressures tonight.








Review of Systems


REVIEW OF SYSTEMS: 


CONSTITUTIONAL: No fever, no malaise, no fatigue. 


HEENT: No recent visual problems or hearing problems. Denied any sore throat. 


CARDIOVASCULAR: no palpitations, no syncope. 


PULMONARY: As mentioned in HPI


GASTROINTESTINAL: No diarrhea, no nausea, no vomiting, no abdominal pain. 

Normoactive bowel sounds. 


NEUROLOGICAL: No headaches, no weakness, no numbness. 


HEMATOLOGICAL: Denies any bleeding or petechiae. 


GENITOURINARY: Denies any burning micturition, frequency, or urgency. 


MUSCULOSKELETAL/RHEUMATOLOGICAL: Denies any joint pain, swelling, or any muscle 

pain. 


ENDOCRINE: Denies any polyuria or polydipsia. 





The rest of the 14-point review of systems is negative.











Past Medical History


Past Medical History: Asthma, Chest Pain / Angina, Heart Failure, CVA/TIA, GERD/

Reflux, Hyperlipidemia, Hypertension, Osteoarthritis (OA), Seizure Disorder


Additional Past Medical History / Comment(s): recent anemia-transfused last 

month, SOB, dizziness, hx. hiatal hernia, herniated discs back, TIA years ago, 

last seizure 8 yrs. ago


History of Any Multi-Drug Resistant Organisms: None Reported


Past Surgical History: Heart Catheterization, Hysterectomy


Additional Past Surgical History / Comment(s): fatty tumor removed back, hole 

in heart repaired w/"patch"


Past Anesthesia/Blood Transfusion Reactions: Postoperative Nausea & Vomiting (

PONV)


Past Psychological History: Depression


Smoking Status: Never smoker


Past Alcohol Use History: None Reported


Past Drug Use History: None Reported





- Past Family History


  ** Mother


Family Medical History: Coronary Artery Disease (CAD)


Additional Family Medical History / Comment(s): heart problems





Medications and Allergies


 Home Medications











 Medication  Instructions  Recorded  Confirmed  Type


 


Albuterol Nebulized [Ventolin 2.5 mg INHALATION RT-Q4H PRN 07/08/15 09/14/17 

History





Nebulized]    


 


Losartan [Cozaar] 50 mg PO DAILY 07/08/15 09/14/17 History


 


OXcarbazepine [Trileptal] 600 mg PO BID 07/08/15 09/14/17 History


 


Omeprazole [PriLOSEC] 20 mg PO BID 07/08/15 09/14/17 History


 


ALPRAZolam [Xanax] 0.25 mg PO TID 07/10/15 09/14/17 History


 


ARIPiprazole [Abilify] 5 mg PO QAM 09/07/17 09/14/17 History


 


Albuterol Inhaler [Ventolin Hfa 1 - 2 puff INHALATION RT-Q6H PRN 09/07/17 09/14/ 17 History





Inhaler]    


 


Gabapentin 800 mg PO TID 09/07/17 09/14/17 History


 


Glycopyrrolate/Formoterol Fum 2 puff INHALATION RT-BID 09/07/17 09/14/17 History





[Bevespi Aerosphere Inhaler]    


 


HYDROmorphone HCL [Dilaudid] 4 mg PO Q4H 09/07/17 09/14/17 History


 


Mirtazapine 30 mg PO DAILY@2000 09/07/17 09/14/17 History


 


Promethazine 6.25MG/5Ml [Phenergan 6.25 mg PO AC-TID 09/07/17 09/14/17 History





Syrup]    


 


SILVER sulfADIAZINE Cream 1 applic TOPICAL BID 09/07/17 09/14/17 History





[Silvadene 1% Cream]    


 


Simvastatin [Zocor] 20 mg PO DAILY 09/07/17 09/14/17 History


 


guaiFENesin [Mucinex] 600 mg PO Q12H 09/07/17 09/14/17 History


 


Aspirin 81 mg PO DAILY #1 chewable 09/11/17 09/14/17 Rx


 


Furosemide [Lasix] 20 mg PO DAILY #30 tab 09/11/17 09/14/17 Rx


 


Sodium Chloride Tab 1.5 gm PO BID #60  09/11/17 09/14/17 Rx


 


Carvedilol [Coreg] 25 mg PO BID-W/MEALS 09/14/17 09/14/17 History


 


Melatonin 3 mg PO HS 09/14/17 09/14/17 History


 


Multivitamins, Thera [Multivitamin 1 tab PO DAILY 09/14/17 09/14/17 History





(formulary)]    











 Allergies











Allergy/AdvReac Type Severity Reaction Status Date / Time


 


codeine Allergy  Dyspnea, Verified 09/14/17 23:18





   Lip  





   Swelling  


 


wheat Allergy  Swelling Verified 09/14/17 22:07














Physical Exam


Vitals: 


 Vital Signs











  Temp Pulse Pulse Resp BP BP BP


 


 09/15/17 12:05   62     


 


 09/15/17 11:55   60     


 


 09/15/17 11:38  98.2 F   58 L  18   109/66 


 


 09/15/17 07:45  97.6 F   56 L  18    157/96


 


 09/15/17 04:54   56 L     


 


 09/15/17 04:44   55 L     


 


 09/15/17 04:00    51 L  18   


 


 09/15/17 03:23  98.1 F   55 L  18   184/91 


 


 09/15/17 02:39   57 L   18  189/81  


 


 09/15/17 02:25   52 L   18  200/87  


 


 09/15/17 01:45   53 L   18  196/81  


 


 09/15/17 00:57  97.7 F      


 


 09/15/17 00:56   56 L   18  199/84  


 


 09/15/17 00:04   61   18  203/95  


 


 09/14/17 23:24   59 L   18  219/98  


 


 09/14/17 22:04  97.3 F L    65 H  207/103  














  Pulse Ox


 


 09/15/17 12:05 


 


 09/15/17 11:55 


 


 09/15/17 11:38  98


 


 09/15/17 07:45  99


 


 09/15/17 04:54 


 


 09/15/17 04:44 


 


 09/15/17 04:00 


 


 09/15/17 03:23  98


 


 09/15/17 02:39  100


 


 09/15/17 02:25  100


 


 09/15/17 01:45  100


 


 09/15/17 00:57 


 


 09/15/17 00:56  100


 


 09/15/17 00:04  100


 


 09/14/17 23:24  100


 


 09/14/17 22:04  98








 Intake and Output











 09/15/17 09/15/17 09/15/17





 06:59 14:59 22:59


 


Intake Total  100 


 


Balance  100 


 


Intake:   


 


  Oral  100 


 


Other:   


 


  Voiding Method Bedpan Bedpan 





  Diaper 


 


  # Voids 1 1 


 


  Weight 88.904 kg  











PHYSICAL EXAMINATION: 





GENERAL: The patient is alert and oriented x3, not in any acute distress. Well 

developed, well nourished. 


HEENT: Pupils are round and equally reacting to light. EOMI. No scleral 

icterus. No conjunctival pallor. Normocephalic, atraumatic. No pharyngeal 

erythema. No thyromegaly. 


CARDIOVASCULAR: S1 and S2 present. No murmurs, rubs, or gallops. 


PULMONARY: Chest is clear to auscultation, no wheezing or crackles. 


ABDOMEN: Soft, nontender, nondistended, normoactive bowel sounds. No palpable 

organomegaly. 


MUSCULOSKELETAL: No joint swelling or deformity.


EXTREMITIES: No cyanosis, clubbing, or pedal edema. 


NEUROLOGICAL: Gross neurological examination did not reveal any focal deficits. 


SKIN: No rashes. 











Results


CBC & Chem 7: 


 09/14/17 22:41





 09/15/17 13:11


Labs: 


 Abnormal Lab Results - Last 24 Hours (Table)











  09/14/17 09/14/17 09/14/17 Range/Units





  22:41 22:41 22:41 


 


Hgb  10.8 L    (11.4-16.0)  gm/dL


 


RDW  16.0 H    (11.5-15.5)  %


 


D-Dimer    0.60 H  (<0.60)  mg/L FEU


 


Sodium   128 L   (137-145)  mmol/L


 


Chloride   94 L   ()  mmol/L


 


BUN   6 L   (7-17)  mg/dL


 


Creatinine   0.48 L   (0.52-1.04)  mg/dL


 


Total Protein   6.2 L   (6.3-8.2)  g/dL














  09/15/17 Range/Units





  13:11 


 


Hgb   (11.4-16.0)  gm/dL


 


RDW   (11.5-15.5)  %


 


D-Dimer   (<0.60)  mg/L FEU


 


Sodium  130 L  (137-145)  mmol/L


 


Chloride   ()  mmol/L


 


BUN   (7-17)  mg/dL


 


Creatinine   (0.52-1.04)  mg/dL


 


Total Protein   (6.3-8.2)  g/dL














Thrombosis Risk Factor Assmnt





- Choose All That Apply


Each Factor Represents 1 point: Abnormal pulmonary function (COPD), Obesity (

BMI >25)


Each Risk Factor Represents 2 Points: Age 61-74 years


Thrombosis Risk Factor Assessment Total Risk Factor Score: 4


Thrombosis Risk Factor Assessment Level: Moderate Risk





Assessment and Plan


Plan: 


#1 chest pain: Rule out acute coronary syndromes patient has atypical chest 

pain etiology of which is unclear at this time.  Was evaluated by cardiology.


#2 hypovolemic hyponatremia: Patient will continue on Lasix and give her a dose 

of IV Lasix. 


 #3 congestive heart failure chronic diastolic dysfunction with acute 

exacerbation Lasix merriment as mentioned above.  And repeat lites and kidney 

function tomorrow.


#4 hypertension: Elevated blood pressures are secondary to salt tablets, 

patient will be continued on her home regimen along with increasing losartan 

from 5200 monitor blood pressures.


#4 hyperlipidemia 


 #5 gastric residual reflux disease


#6 primary osteoarthritis


 #7 chronic low back pain 


 #8 depression

## 2017-09-15 NOTE — P.NPCON
History of Present Illness





- Reason for Consult


hyponatremia





- History of Present Illness





Reason for consultation: Hyponatremia





History of present illness:


Patient is a 67-year-old female seen in renal consultation for hyponatremia.  

She was recently admitted here earlier this month and her sodium level was as 

low as 120 as of 09/07/2017.  During that admission she received salt tabs as 

well as Lasix in sodium level gradually improved.  Patient developed chest pain 

which started a few hours prior to admission and came back to the hospital.  

She also noticed her blood pressure was in the systolic 190s and was even 

higher when she presented to the hospital.  Her blood pressure medications were 

adjusted by cardiology and blood pressure is now well controlled.  She denies 

any diaphoresis.  Denies any vomiting or diarrhea.  Denies palpitations.  No 

syncopal episodes.  She did have a CTA done this admission which revealed a 4.2 

cm thoracic aortic aneurysm as well as mild pulmonary edema.  No evidence of 

pulmonary embolus.  Echocardiogram done and revealed moderate mitral 

regurgitation along with preserved ejection fraction.  Admits to good urine 

output.  No hematuria or dysuria.  She is tolerating oral intake well.  States 

she watches her fluid intake and tries are limited to less than 40 ounces daily.





Vital signs are stable.


General: The patient appeared well nourished and normally developed. 


HEENT: Head exam is unremarkable. Neck is without jugular venous distension.


LUNGS: Lungs are clear to auscultation and percussion. Breath sounds decreased.


HEART: Rate and Rhythm are regular. First and second heart sounds normal. No 

murmurs, rubs or gallops. 


ABDOMEN: Abdominal exam reveals normal bowel sounds. Non-tender and non-

distended. No evidence of peritonitis.


EXTREMITITES: No clubbing, cyanosis, or edema.





Past Medical History


Past Medical History: Asthma, Chest Pain / Angina, Heart Failure, CVA/TIA, GERD/

Reflux, Hyperlipidemia, Hypertension, Osteoarthritis (OA), Seizure Disorder


Additional Past Medical History / Comment(s): recent anemia-transfused last 

month, SOB, dizziness, hx. hiatal hernia, herniated discs back, TIA years ago, 

last seizure 8 yrs. ago


History of Any Multi-Drug Resistant Organisms: None Reported


Past Surgical History: Heart Catheterization, Hysterectomy


Additional Past Surgical History / Comment(s): fatty tumor removed back, hole 

in heart repaired w/"patch"


Past Anesthesia/Blood Transfusion Reactions: Postoperative Nausea & Vomiting (

PONV)


Past Psychological History: Depression


Smoking Status: Never smoker


Past Alcohol Use History: None Reported


Past Drug Use History: None Reported





- Past Family History


  ** Mother


Family Medical History: Coronary Artery Disease (CAD)


Additional Family Medical History / Comment(s): heart problems





Medications and Allergies


 Home Medications











 Medication  Instructions  Recorded  Confirmed  Type


 


Albuterol Nebulized [Ventolin 2.5 mg INHALATION RT-Q4H PRN 07/08/15 09/14/17 

History





Nebulized]    


 


Losartan [Cozaar] 50 mg PO DAILY 07/08/15 09/14/17 History


 


OXcarbazepine [Trileptal] 600 mg PO BID 07/08/15 09/14/17 History


 


Omeprazole [PriLOSEC] 20 mg PO BID 07/08/15 09/14/17 History


 


ALPRAZolam [Xanax] 0.25 mg PO TID 07/10/15 09/14/17 History


 


ARIPiprazole [Abilify] 5 mg PO QAM 09/07/17 09/14/17 History


 


Albuterol Inhaler [Ventolin Hfa 1 - 2 puff INHALATION RT-Q6H PRN 09/07/17 09/14/ 17 History





Inhaler]    


 


Gabapentin 800 mg PO TID 09/07/17 09/14/17 History


 


Glycopyrrolate/Formoterol Fum 2 puff INHALATION RT-BID 09/07/17 09/14/17 History





[Bevespi Aerosphere Inhaler]    


 


HYDROmorphone HCL [Dilaudid] 4 mg PO Q4H 09/07/17 09/14/17 History


 


Mirtazapine 30 mg PO DAILY@2000 09/07/17 09/14/17 History


 


Promethazine 6.25MG/5Ml [Phenergan 6.25 mg PO AC-TID 09/07/17 09/14/17 History





Syrup]    


 


SILVER sulfADIAZINE Cream 1 applic TOPICAL BID 09/07/17 09/14/17 History





[Silvadene 1% Cream]    


 


Simvastatin [Zocor] 20 mg PO DAILY 09/07/17 09/14/17 History


 


guaiFENesin [Mucinex] 600 mg PO Q12H 09/07/17 09/14/17 History


 


Aspirin 81 mg PO DAILY #1 chewable 09/11/17 09/14/17 Rx


 


Furosemide [Lasix] 20 mg PO DAILY #30 tab 09/11/17 09/14/17 Rx


 


Sodium Chloride Tab 1.5 gm PO BID #60  09/11/17 09/14/17 Rx


 


Carvedilol [Coreg] 25 mg PO BID-W/MEALS 09/14/17 09/14/17 History


 


Melatonin 3 mg PO HS 09/14/17 09/14/17 History


 


Multivitamins, Thera [Multivitamin 1 tab PO DAILY 09/14/17 09/14/17 History





(formulary)]    











 Allergies











Allergy/AdvReac Type Severity Reaction Status Date / Time


 


codeine Allergy  Dyspnea, Verified 09/14/17 23:18





   Lip  





   Swelling  


 


wheat Allergy  Swelling Verified 09/14/17 22:07














Physical Exam


Vitals: 


 Vital Signs











  Temp Pulse Pulse Resp BP BP BP


 


 09/15/17 12:05   62     


 


 09/15/17 11:55   60     


 


 09/15/17 11:38  98.2 F   58 L  18   109/66 


 


 09/15/17 07:45  97.6 F   56 L  18    157/96


 


 09/15/17 04:54   56 L     


 


 09/15/17 04:44   55 L     


 


 09/15/17 04:00    51 L  18   


 


 09/15/17 03:23  98.1 F   55 L  18   184/91 


 


 09/15/17 02:39   57 L   18  189/81  


 


 09/15/17 02:25   52 L   18  200/87  


 


 09/15/17 01:45   53 L   18  196/81  


 


 09/15/17 00:57  97.7 F      


 


 09/15/17 00:56   56 L   18  199/84  


 


 09/15/17 00:04   61   18  203/95  


 


 09/14/17 23:24   59 L   18  219/98  


 


 09/14/17 22:04  97.3 F L    65 H  207/103  














  Pulse Ox


 


 09/15/17 12:05 


 


 09/15/17 11:55 


 


 09/15/17 11:38  98


 


 09/15/17 07:45  99


 


 09/15/17 04:54 


 


 09/15/17 04:44 


 


 09/15/17 04:00 


 


 09/15/17 03:23  98


 


 09/15/17 02:39  100


 


 09/15/17 02:25  100


 


 09/15/17 01:45  100


 


 09/15/17 00:57 


 


 09/15/17 00:56  100


 


 09/15/17 00:04  100


 


 09/14/17 23:24  100


 


 09/14/17 22:04  98








 Intake and Output











 09/14/17 09/15/17 09/15/17





 22:59 06:59 14:59


 


Other:   


 


  Voiding Method  Bedpan Bedpan





   Diaper


 


  # Voids  1 1


 


  Weight 88.904 kg 88.904 kg 














Results





- Lab Results


 Most recent lab results











Calcium  9.0 mg/dL (8.4-10.2)   09/14/17  22:41    


 


Magnesium  1.6 mg/dL (1.6-2.3)   09/14/17  22:41    














 09/14/17 22:41





 09/14/17 22:41





Assessment and Plan


Plan: 





Assessment:


#1.  Hyponatremia, slightly hypervolemic in nature as evidenced by mild 

pulmonary edema on CTA.  However also component of SIADH particularly from 

Trileptal and Remeron.  Sodium level 128.recent TSH was normal.  Uric acid 

level was low which can be seen in SIADH due to increased uric acid clearance.


#2.  4.2 cm thoracic aortic aneurysm.


#3.  Benign hypertension.  Now controlled.





Plan:


Continue Lasix 20 mg once daily.


I strongly advised to follow a low-salt and 50 oz fluid restricted diet daily.


I will decrease the dose of salt tabs to 1 g twice daily - this will need to be 

decreased further depending on her volume status and her sodium level.


She will need to get BMP checked within 3-4 days of discharge and follow-up as 

an outpatient in the next 2 weeks.


May also need to consider alternatives for Trileptal and Remeron.





Thank you for the consultation.  I will continue to follow the patient with you 

during her hospital stay.

## 2017-09-15 NOTE — CONS
CONSULTATION



Mrs. Flores is a 67-year-old lady with a known history of previous surgical repair for

ASD performed several years ago.  She also has a history of hypertension, a recent

cardiac cath 2 years ago, which revealed no significant obstructive CAD.  She was in

the hospital a few days ago for hyponatremia and COPD with exacerbation.  She went home

and after 3 days yesterday she was feeling more short of breath and she felt queasy,

tired, and also had a sensation of nausea and had sharp pains across the chest and her

 took her blood pressure which was 190/100, and they brought her to the

hospital.  After arrival, she was hypertensive, but blood pressure is better today.

She has no further chest pain. Shortness of breath has also improved.  Troponins are

normal.  BNP has not been ordered.  EKG revealed a sinus mechanism with mild IVCD.  No

acute changes.  Sodium today is 128.  She is resting comfortably without significant

symptoms.



PAST MEDICAL HISTORY:

1. Status post surgical repair for ASD.

2. Cardiac cath performed in 2015, did not reveal any significant obstructive CAD.

    Ejection fraction was in the range of 50% last in 2 years ago.  She carries a

    diagnosis of nonischemic cardiomyopathy, but her ejection fraction has come back to

    the 50% range or better.  She has a codominant system with a 30% to 40% mid RCA

    narrowing.  No other significant disease was noted.  Ejection fraction was 50%.

    This was based on a cardiac cath that was performed in July 2015.

3. This patient also has history of recurrent hyponatremia, uncontrolled hypertension

    and also history of some diastolic heart failure as well.



MEDICATIONS:

Medications at home include Lasix 20 mg daily, simvastatin 20 mg daily, losartan 50 mg

daily, Coreg 25 mg b.i.d., aspirin 81 mg daily.  She also takes Abilify, Xanax,

Mucinex, and Dilaudid.



ALLERGIES:

She is allergic to CODEINE.



PHYSICAL EXAMINATION:

On examination, the blood pressure has improved to 157/96, pulse rate is 60 per minute,

regular.

HEENT: Unremarkable.  Fundus was not examined by me.

Neck is supple.  There is JVD of 1 cm.  There is no carotid bruit.

Heart exam reveals S1, S2 heard normally.  No significant murmurs are detected.

Lungs reveal mild expiratory rhonchi.  There is no evidence of any rales.

Abdomen is soft and nontender.

Lower extremities revealed no significant edema.  Pulses are palpable.

Central nervous system was grossly within normal limits.



LABORATORY DATA:

Laboratory data suggests that troponins are normal.  D-dimer was 0.60.  Patient had a

CT angio which revealed no evidence of pulmonary embolism, but there was a 4.2 cm

dilatation of the ascending aorta.



IMPRESSION:

1. Mild exacerbation of probable reactive airway disease rather than chronic

    obstructive pulmonary disease.  Patient apparently is not a smoker.

2. History of mild diastolic dysfunction with mild heart failure.

3. Hyponatremia, which is sodium in the range of 128.

4. Uncontrolled hypertension.

5. Hyperlipidemia.

6. Status post surgical repair for ASD without significant coronary artery disease

    based on a cardiac cath 2 years ago.



RECOMMENDATIONS:

I am recommending echocardiogram, BNP level, 1 dose of Lasix, fluid restriction, add

clonidine and Norvasc 5 mg b.i.d., obtain echocardiogram.  Once we optimize her blood

pressure control, she can possibly be discharged tomorrow and see Dr. DANNIE Lane in

the office.  I am recommending that she should also have a pulmonary evaluation and

will request Dr. Rust to see the patient today.  She probably has some reactive

airway disease.  From a cardiac standpoint, no aggressive intervention is advised.  For

her 4.2 cm ascending aortic aneurysm, optimal BP control and noninvasive followup is

advised.



Thank you very much for the consult.





MMODL / IJN: [id number] / Job#: [id number]

## 2017-09-16 NOTE — P.PN
Subjective





Patient seen in follow-up for hyponatremia.  Sodium level is 129 this morning.  

She did have an episode of tonic-clonic seizure that was witnessed by the 

nurses.  She is maintained on anticonvulsants.  Currently she is resting in 

bed.  She is awake and alert.  Slightly confused.  No vomiting or diarrhea.  

Hemodynamically stable.





Vital signs are stable.


General: The patient appeared well nourished and normally developed. 


HEENT: Head exam is unremarkable. Neck is without jugular venous distension.


LUNGS: Lungs are clear to auscultation and percussion. Breath sounds decreased.


HEART: Rate and Rhythm are regular. First and second heart sounds normal. No 

murmurs, rubs or gallops. 


ABDOMEN: Abdominal exam reveals normal bowel sounds. Non-tender and non-

distended. No evidence of peritonitis.


EXTREMITITES: No clubbing, cyanosis, or edema.





Objective





- Vital Signs


Vital signs: 


 Vital Signs











Temp  98 F   09/16/17 16:00


 


Pulse  66   09/16/17 16:00


 


Resp  15   09/16/17 16:00


 


BP  157/86   09/16/17 16:00


 


Pulse Ox  99   09/16/17 16:00








 Intake & Output











 09/15/17 09/16/17 09/16/17





 18:59 06:59 18:59


 


Intake Total 340 120 735


 


Output Total   425


 


Balance 340 120 310


 


Intake:   


 


  Oral 340 120 735


 


Output:   


 


  Urine   425


 


Other:   


 


  Voiding Method Bedpan  Bedside Commode





 Diaper  Bedpan





   Diaper


 


  # Voids 1 1 1


 


  # Bowel Movements   1














- Labs


CBC & Chem 7: 


 09/14/17 22:41





 09/16/17 06:28


Labs: 


 Abnormal Lab Results - Last 24 Hours (Table)











  09/16/17 09/16/17 Range/Units





  06:28 16:51 


 


Sodium  129 L   (137-145)  mmol/L


 


Chloride  97 L   ()  mmol/L


 


Creatinine  0.47 L   (0.52-1.04)  mg/dL


 


POC Glucose (mg/dL)   126 H  (75-99)  mg/dL


 


Calcium  8.3 L   (8.4-10.2)  mg/dL


 


HDL Cholesterol  65 H   (40-60)  mg/dL














Assessment and Plan


Plan: 





Assessment:


#1.  Hyponatremia, slightly hypervolemic in nature as evidenced by mild 

pulmonary edema on CTA.  However also component of SIADH particularly from 

Trileptal and Remeron.  Sodium level 129. Recent TSH was normal.  Uric acid 

level was low which can be seen in SIADH due to increased uric acid clearance.


#2.  4.2 cm thoracic aortic aneurysm.


#3.  Benign hypertension.  Now controlled.


#4.  Seizures.  Brain CT benign.





Plan:


Continue Lasix 20 mg once daily.


I strongly advised to follow a low-salt and 50 oz fluid restricted diet daily.


Continue salt tabs 1 g twice daily - this will need to be decreased further 

depending on her volume status and her sodium level.


May also need to consider alternatives for Trileptal and Remeron.


Continue current antihypertensives - Hydralazine has been added as well.


Neurology recommendations pending.

## 2017-09-16 NOTE — CT
EXAMINATION TYPE: CT brain wo con

 

DATE OF EXAM: 9/16/2017

 

COMPARISON: 2/26/2011

 

HISTORY: SEIZURE AND HYPONATREMIA.

 

CT DLP: 981.7 mGycm.  Automated Exposure Control for Dose Reduction was Utilized.

 

 

TECHNIQUE: CT scan of the head is performed without contrast.

 

FINDINGS:   There is no acute intracranial hemorrhage or midline shift identified. There is diffuse v
entricular and sulcal prominence consistent with diffuse age-related cerebral atrophy.  There is low-
attenuation in the periventricular white matter consistent with chronic small vessel ischemic change.
  The globes are intact and the visualized sinuses are clear.   Pituitary appears within normal limit
s of size.

 

IMPRESSION:  No acute intracranial hemorrhage, mass effect, or midline shift is seen. No evidence of 
herniation or CT evidence of cerebral edema in this patient with seizures and hyponatremia. Pituitary
 does not appear enlarged.

## 2017-09-16 NOTE — P.PN
Subjective





This is a pleasant 67-year-old  female patient with a past medical 

history significant for hypertension and status post ASD repair who presented 

to the hospital with chest discomfort that shortness of breath.





On arrival, the patient was hypertensive.  She also was found to be 

hyponatremic.





The patient was diagnosed with mild congestive heart failure exacerbation and 

she was started on a small dose of Lasix.  Also the blood pressure medications 

were adjusted.





On follow-up with her today, the pressure overall is controlled except for in 

the morning which was elevated. 


She still have mild I lateral lower extremity pitting edema noted.  She 

continues to be on Lasix at 20 mg PO daily.





Objective





- Vital Signs


Vital signs: 


 Vital Signs











Temp  98.1 F   09/16/17 08:00


 


Pulse  63   09/16/17 08:00


 


Resp  16   09/16/17 08:00


 


BP  171/85   09/16/17 08:00


 


Pulse Ox  95   09/16/17 08:00








 Intake & Output











 09/15/17 09/16/17 09/16/17





 18:59 06:59 18:59


 


Intake Total 340 120 240


 


Output Total   225


 


Balance 340 120 15


 


Intake:   


 


  Oral 340 120 240


 


Output:   


 


  Urine   225


 


Other:   


 


  Voiding Method Bedpan  Bedpan





 Diaper  Diaper


 


  # Voids 1 1 1


 


  # Bowel Movements   1














- Constitutional


General appearance: Present: no acute distress





- Respiratory


Respiratory: bilateral: wheezing





- Cardiovascular


Rhythm: regular


Heart sounds: normal: S1, S2


Abnormal Heart Sounds: Present: systolic murmur





- Labs


CBC & Chem 7: 


 09/14/17 22:41





 09/16/17 06:28


Labs: 


 Abnormal Lab Results - Last 24 Hours (Table)











  09/15/17 09/16/17 Range/Units





  13:11 06:28 


 


Sodium  130 L  129 L  (137-145)  mmol/L


 


Chloride   97 L  ()  mmol/L


 


Creatinine   0.47 L  (0.52-1.04)  mg/dL


 


Calcium   8.3 L  (8.4-10.2)  mg/dL


 


HDL Cholesterol   65 H  (40-60)  mg/dL














Assessment and Plan


Plan: 





At this point, I am going to add hydralazine to the current medical treatment 

for better blood pressure control.  Also continue the Lasix by mouth.  Continue 

monitor the kidney function and electrolytes.

## 2017-09-16 NOTE — P.PN
Subjective


Patient was admitted for chest pain which appeared to be [place] skeletal patient 

also has hyponatremia from a hypervolemic hyponatremia for which patient is on 

oral Lasix at this time.  Patient is also being treated for asthma 

exacerbation.  Patient had tonic-clonic seizure today for which patient is 

being a valid by neurology and the CT of the head without contrast is being 

obtained to rule out any acute hemorrhage.





Patient denied any fever, chills, chest pain, nausea, vomiting, shortness of 

breath today








Objective





- Vital Signs


Vital signs: 


 Vital Signs











Temp  97.9 F   09/16/17 12:00


 


Pulse  60   09/16/17 15:15


 


Resp  16   09/16/17 12:00


 


BP  161/86   09/16/17 13:07


 


Pulse Ox  97   09/16/17 12:00








 Intake & Output











 09/15/17 09/16/17 09/16/17





 18:59 06:59 18:59


 


Intake Total 340 120 735


 


Output Total   425


 


Balance 340 120 310


 


Intake:   


 


  Oral 340 120 735


 


Output:   


 


  Urine   425


 


Other:   


 


  Voiding Method Bedpan  Bedside Commode





 Diaper  Bedpan





   Diaper


 


  # Voids 1 1 1


 


  # Bowel Movements   1














- Exam


GENERAL: The patient is alert and oriented x3, not in any acute distress. Well 

developed, well nourished. 


HEENT: Pupils are round and equally reacting to light. EOMI. No scleral 

icterus. No conjunctival pallor. Normocephalic, atraumatic. No pharyngeal 

erythema. No thyromegaly. 


CARDIOVASCULAR: S1 and S2 present. No murmurs, rubs, or gallops. 


PULMONARY: Chest is clear to auscultation, no wheezing or crackles. 


ABDOMEN: Soft, nontender, nondistended, normoactive bowel sounds. No palpable 

organomegaly. 


MUSCULOSKELETAL: No joint swelling or deformity.


EXTREMITIES: No cyanosis, clubbing, or pedal edema. 


NEUROLOGICAL: Gross neurological examination did not reveal any focal deficits. 


SKIN: No rashes. 











- Labs


CBC & Chem 7: 


 09/14/17 22:41





 09/16/17 06:28


Labs: 


 Abnormal Lab Results - Last 24 Hours (Table)











  09/16/17 Range/Units





  06:28 


 


Sodium  129 L  (137-145)  mmol/L


 


Chloride  97 L  ()  mmol/L


 


Creatinine  0.47 L  (0.52-1.04)  mg/dL


 


Calcium  8.3 L  (8.4-10.2)  mg/dL


 


HDL Cholesterol  65 H  (40-60)  mg/dL














Assessment and Plan


Plan: 


#1 chest pain: Rule out acute coronary syndromes patient has atypical chest 

pain etiology of which is unclear at this time.  Was evaluated by cardiology.


#2 hypovolemic hyponatremia: Patient is on oral Lasix, sodium today is 129


 #3 congestive heart failure chronic diastolic dysfunction with acute 

exacerbation Lasix merriment as mentioned above.  And repeat lites and kidney 

function tomorrow.


#4 hypertension: Elevated blood pressures are secondary to salt tablets, 

patient will be continued on her home regimen along with increasing losartan 

from 5-20 monitor blood pressures.


#4 hyperlipidemia 


 #5 gastric residual reflux disease


#6 primary osteoarthritis


 #7 chronic low back pain 


 #8 depression


#9 patient had an episode of seizure: CT of the head neurology consult continue 

with Trileptal


#10 possible asthma exacerbation for which patient is on systemic steroids

## 2017-09-16 NOTE — P.CNPUL
History of Present Illness


Consult date: 09/16/17


Reason for consult: dyspnea, chest pain, asthma, abnormal CXR/CT


Chief complaint: Chest pain and shortness of breath


History of present illness: 





Consult dated 09/16/2017





67-year-old female who presented to the emergency department with complaints of 

chest pain.  In addition to chest pain, she apparently complained of being 

short of breath.  She has a history of congestive heart failure and 

hyponatremia and apparently was discharged recently from the hospital some 3 

days prior to her most recent admission on the 14th.  In addition to all of this

, she missed her elevated blood pressure.  On talking to her further, she had 

bit of a cough nonproductive some wheezing and tightness in her chest and 

apparently she's has seen a lung doctor in the past and was told that she had 

asthma.  She believes a lot of her issues right now related to asthma.  She's 

feeling better today than she did yesterday.  Certainly not back to baseline.  

Apparently was given an inhaler.  Not sure the name.  Maybe Bevespi.  She 

states it made her nauseated.





Review of Systems





A 12 point review of system is positive for shortness of breath cough wheezing 

chest tightness and chest pain.  The rest of the 12 point review of system is 

unremarkable.  Denies fever chills.  No coughing up any phlegm.  Not coughing 

up blood.  No nausea vomiting or diarrhea.





Past Medical History


Past Medical History: Asthma, Chest Pain / Angina, Heart Failure, CVA/TIA, GERD/

Reflux, Hyperlipidemia, Hypertension, Osteoarthritis (OA), Seizure Disorder


Additional Past Medical History / Comment(s): recent anemia-transfused last 

month, SOB, dizziness, hx. hiatal hernia, herniated discs back, TIA years ago, 

last seizure 8 yrs. ago


History of Any Multi-Drug Resistant Organisms: None Reported


Past Surgical History: Heart Catheterization, Hysterectomy


Additional Past Surgical History / Comment(s): fatty tumor removed back, hole 

in heart repaired w/"patch"


Past Anesthesia/Blood Transfusion Reactions: Postoperative Nausea & Vomiting (

PONV)


Past Psychological History: Depression


Smoking Status: Never smoker


Past Alcohol Use History: None Reported


Past Drug Use History: None Reported





- Past Family History


  ** Mother


Family Medical History: Coronary Artery Disease (CAD)


Additional Family Medical History / Comment(s): heart problems





Medications and Allergies


 Home Medications











 Medication  Instructions  Recorded  Confirmed  Type


 


Albuterol Nebulized [Ventolin 2.5 mg INHALATION RT-Q4H PRN 07/08/15 09/14/17 

History





Nebulized]    


 


Losartan [Cozaar] 50 mg PO DAILY 07/08/15 09/14/17 History


 


OXcarbazepine [Trileptal] 600 mg PO BID 07/08/15 09/14/17 History


 


Omeprazole [PriLOSEC] 20 mg PO BID 07/08/15 09/14/17 History


 


ALPRAZolam [Xanax] 0.25 mg PO TID 07/10/15 09/14/17 History


 


ARIPiprazole [Abilify] 5 mg PO QAM 09/07/17 09/14/17 History


 


Albuterol Inhaler [Ventolin Hfa 1 - 2 puff INHALATION RT-Q6H PRN 09/07/17 09/14/ 17 History





Inhaler]    


 


Gabapentin 800 mg PO TID 09/07/17 09/14/17 History


 


Glycopyrrolate/Formoterol Fum 2 puff INHALATION RT-BID 09/07/17 09/14/17 History





[Bevespi Aerosphere Inhaler]    


 


HYDROmorphone HCL [Dilaudid] 4 mg PO Q4H 09/07/17 09/14/17 History


 


Mirtazapine 30 mg PO DAILY@2000 09/07/17 09/14/17 History


 


Promethazine 6.25MG/5Ml [Phenergan 6.25 mg PO AC-TID 09/07/17 09/14/17 History





Syrup]    


 


SILVER sulfADIAZINE Cream 1 applic TOPICAL BID 09/07/17 09/14/17 History





[Silvadene 1% Cream]    


 


Simvastatin [Zocor] 20 mg PO DAILY 09/07/17 09/14/17 History


 


guaiFENesin [Mucinex] 600 mg PO Q12H 09/07/17 09/14/17 History


 


Aspirin 81 mg PO DAILY #1 chewable 09/11/17 09/14/17 Rx


 


Furosemide [Lasix] 20 mg PO DAILY #30 tab 09/11/17 09/14/17 Rx


 


Sodium Chloride Tab 1.5 gm PO BID #60  09/11/17 09/14/17 Rx


 


Carvedilol [Coreg] 25 mg PO BID-W/MEALS 09/14/17 09/14/17 History


 


Melatonin 3 mg PO HS 09/14/17 09/14/17 History


 


Multivitamins, Thera [Multivitamin 1 tab PO DAILY 09/14/17 09/14/17 History





(formulary)]    











 Allergies











Allergy/AdvReac Type Severity Reaction Status Date / Time


 


codeine Allergy  Dyspnea, Verified 09/14/17 23:18





   Lip  





   Swelling  


 


wheat Allergy  Swelling Verified 09/14/17 22:07














Physical Exam


Osteopathic Statement: *.  No significant issues noted on an osteopathic 

structural exam other than those noted in the History and Physical/Consult.


Vitals: 


 Vital Signs











  Temp Pulse Pulse Resp BP Pulse Ox


 


 09/16/17 11:37   56 L   14  


 


 09/16/17 08:00  98.1 F   63  16  171/85  95


 


 09/16/17 07:16   63   14  


 


 09/16/17 07:05   63   14   93 L


 


 09/16/17 04:00  97.9 F   62  18  139/69  95


 


 09/16/17 02:57     18  


 


 09/15/17 23:50     18  


 


 09/15/17 22:50    52 L  18  102/52  95


 


 09/15/17 20:00     18  


 


 09/15/17 19:35   60    


 


 09/15/17 19:24   60    


 


 09/15/17 19:23  97.9 F   64  18  113/52  99


 


 09/15/17 16:00  98.1 F   55 L  18  119/57  99


 


 09/15/17 12:05   62    


 


 09/15/17 11:55   60    








 Intake and Output











 09/15/17 09/16/17 09/16/17





 22:59 06:59 14:59


 


Intake Total 240 120 240


 


Output Total   225


 


Balance 240 120 15


 


Intake:   


 


  Oral 240 120 240


 


Output:   


 


  Urine   225


 


Other:   


 


  Voiding Method Bedpan  Bedpan





 Diaper  Diaper


 


  # Voids 1 1 1


 


  # Bowel Movements   1














No acute distress, oriented 3.





HEENT examination is grossly unremarkable.  Mucous membranes are moist.  No 

oral lesions.





Neck supple.  Full range of motion.  No adenopathy or thyromegaly.  Neck veins 

are flat.





Cardiovascular examination reveals regular rhythm rate.  S1-S2 normal.  No S3-

S4 or murmur.





Lungs reveal inspiratory and expiratory wheezes.  Breath sounds are diminished.

  Slight prolongation.  The patient's adventitious lung sounds are more 

prominent on forced maneuver.





Abdomen soft bowel sounds are heard.





Extremities are intact.  No cyanosis clubbing or edema.





Skin without rash.





Neurologic examination is nonfocal.





Results





- Laboratory Findings


CBC and BMP: 


 09/14/17 22:41





 09/16/17 06:28


PT/INR, D-dimer











PT  11.4 sec (9.0-12.0)   09/14/17  22:41    


 


INR  1.1  (<1.2)   09/14/17  22:41    


 


D-Dimer  0.60 mg/L FEU (<0.60)  H  09/14/17  22:41    








Abnormal lab findings: 


 Abnormal Labs











  09/14/17 09/14/17 09/14/17





  22:41 22:41 22:41


 


Hgb  10.8 L  


 


RDW  16.0 H  


 


D-Dimer    0.60 H


 


Sodium   128 L 


 


Chloride   94 L 


 


BUN   6 L 


 


Creatinine   0.48 L 


 


Calcium   


 


Total Protein   6.2 L 


 


HDL Cholesterol   














  09/15/17 09/16/17





  13:11 06:28


 


Hgb  


 


RDW  


 


D-Dimer  


 


Sodium  130 L  129 L


 


Chloride   97 L


 


BUN  


 


Creatinine   0.47 L


 


Calcium   8.3 L


 


Total Protein  


 


HDL Cholesterol   65 H














- Diagnostic Findings


Chest x-ray: image reviewed (X-rays labs and medications are all reviewed.)





Assessment and Plan


(1) Asthma exacerbation


Status: Acute   





(2) CHF (congestive heart failure)


Status: Acute   





(3) CVA (cerebral vascular accident)


Status: Acute   





(4) Seizure disorder


Status: Acute   





(5) Chest pain


Status: Acute   





(6) Hyponatremia


Status: Acute   





(7) Cardiomyopathy


Status: Acute   





(8) HTN (hypertension)


Status: Acute   





(9) Hyperlipemia


Status: Acute   





(10) S/P cardiac cath


Status: Acute   


Plan: 





Plan dated 09/16/2017





The patient will have the medications labs and x-rays reviewed.  She'll need to 

be on corticosteroids and bronchodilators.  She should follow-up with the 

pulmonary clinic after discharge for pulmonary function testing.  She probably 

needs something more for her asthma chronically.  Also had montelukast 10 mg at 

bedtime.








Time with Patient: Greater than 30

## 2017-09-17 NOTE — P.PN
Subjective





Patient seen in follow-up for hyponatremia.  Sodium level is 130 this morning.  

She did have an episode of tonic-clonic seizure that was witnessed by the 

nurses yesterday.  She is maintained on anticonvulsants.  Currently she is 

resting in bed.  She is awake and alert.  Continues to have shaking of her arm.

  No vomiting or diarrhea.  Blood pressures have been labile but quite high 

this morning.





Vital signs are stable.


General: The patient appeared well nourished and normally developed. 


HEENT: Head exam is unremarkable. Neck is without jugular venous distension.


LUNGS: Lungs are clear to auscultation and percussion. Breath sounds decreased.


HEART: Rate and Rhythm are regular. First and second heart sounds normal. No 

murmurs, rubs or gallops. 


ABDOMEN: Abdominal exam reveals normal bowel sounds. Non-tender and non-

distended. No evidence of peritonitis.


EXTREMITITES: No clubbing, cyanosis, or edema.





Objective





- Vital Signs


Vital signs: 


 Vital Signs











Temp  97.1 F L  09/17/17 08:00


 


Pulse  76   09/17/17 08:51


 


Resp  20   09/17/17 08:00


 


BP  197/76   09/17/17 08:00


 


Pulse Ox  97   09/17/17 08:38








 Intake & Output











 09/16/17 09/17/17 09/17/17





 18:59 06:59 18:59


 


Intake Total 735  


 


Output Total 425 300 


 


Balance 310 -300 


 


Weight  89 kg 


 


Intake:   


 


  Oral 735  


 


Output:   


 


  Urine 425 300 


 


Other:   


 


  Voiding Method Bedside Commode Bedside Commode 





 Bedpan Bedpan 





 Diaper Diaper 


 


  # Voids 1 1 


 


  # Bowel Movements 1  














- Labs


CBC & Chem 7: 


 09/14/17 22:41





 09/17/17 06:19


Labs: 


 Abnormal Lab Results - Last 24 Hours (Table)











  09/16/17 09/16/17 09/17/17 Range/Units





  16:51 21:20 06:19 


 


Sodium    130 L  (137-145)  mmol/L


 


Chloride    95 L  ()  mmol/L


 


Creatinine    0.40 L  (0.52-1.04)  mg/dL


 


Glucose    123 H  (74-99)  mg/dL


 


POC Glucose (mg/dL)  126 H  173 H   (75-99)  mg/dL














  09/17/17 Range/Units





  06:22 


 


Sodium   (137-145)  mmol/L


 


Chloride   ()  mmol/L


 


Creatinine   (0.52-1.04)  mg/dL


 


Glucose   (74-99)  mg/dL


 


POC Glucose (mg/dL)  119 H  (75-99)  mg/dL














Assessment and Plan


Plan: 





Assessment:


#1.  Hyponatremia, slightly hypervolemic in nature as evidenced by mild 

pulmonary edema on CTA.  However also component of SIADH particularly from 

Trileptal and Remeron.  Sodium level 130. Recent TSH was normal.  Uric acid 

level was low which can be seen in SIADH due to increased uric acid clearance.


#2.  4.2 cm thoracic aortic aneurysm.


#3.  Benign hypertension.  Labile.


#4.  Seizures.  Brain CT benign.





Plan:


Continue Lasix 20 mg once daily.


I strongly advised to follow a low-salt and 50 oz fluid restricted diet.


Continue salt tabs 1 g twice daily - this will need to be decreased further 

depending on her volume status and her sodium level.


May also need to consider alternatives for Trileptal and Remeron.


Continue current antihypertensives - Hydralazine has been added as a scheduled 

dose as well as additional when necessary if systolic blood pressure greater 

than 160.  She will benefit from scheduled antihypertensives, particularly beta 

blocker or calcium channel blocker if blood pressures remain persistently 

elevated.  Her pressures have been quite labile at this time. 


Neurology recommendations pending.

## 2017-09-17 NOTE — P.PN
Subjective





Progress Note Dated 09/17/2017





67-year-old Female with History of Underlying Asthma.  She Came with Complaints 

of Chest Tightness Shortness of Breath and Cough.  It's Clear That Her Asthma 

Is Likely Active.  Does Have a History of Underlying Heart Failure and Also 

History of Hyponatremia.  Has Not Seen a Lung Doctor for Some Time.  Used To 

See a Lung Doctor but Cannot Remember the Doctor's Name.  In Addition to Asthma 

She Has a History of Angina Heart Failure CVA GERD Hyperlipidemia Hypertension 

Seizure Disorder and DJD.  From the Pulmonary Standpoint Doing Better.  

Breathing Better.  Less Shortness of Breath.  Less Chest Tightness.  Less 

Cough.  She Was Placed on Bronchodilators and Corticosteroids.





Objective





- Vital Signs


Vital signs: 


 Vital Signs











Temp  97.1 F L  09/17/17 08:00


 


Pulse  76   09/17/17 08:51


 


Resp  20   09/17/17 08:00


 


BP  197/76   09/17/17 08:00


 


Pulse Ox  97   09/17/17 08:38








 Intake & Output











 09/16/17 09/17/17 09/17/17





 18:59 06:59 18:59


 


Intake Total 735  


 


Output Total 425 300 


 


Balance 310 -300 


 


Weight  89 kg 


 


Intake:   


 


  Oral 735  


 


Output:   


 


  Urine 425 300 


 


Other:   


 


  Voiding Method Bedside Commode Bedside Commode 





 Bedpan Bedpan 





 Diaper Diaper 


 


  # Voids 1 1 


 


  # Bowel Movements 1  














- Exam





No acute distress, oriented 3.





HEENT examination is grossly unremarkable.  Mucous membranes are moist.  No 

oral lesions.





Neck supple.  Full range of motion.  No adenopathy or thyromegaly.  Neck veins 

are flat.





Cardiovascular examination reveals regular rhythm rate.  Heart sounds are 

distant.  S1-S2 normal.  No S3-S4.  No distinct murmur noted.





Lungs reveal some mild expiratory wheezes.  Breath sounds are improved.  Slight 

prolongation.  No crackles.





Abdomen is obese bowel sounds are heard.





Extremities are intact.  No cyanosis clubbing or edema.





Skin without rash.





Neurologic examination is prepared nonfocal.





- Labs


CBC & Chem 7: 


 09/14/17 22:41





 09/17/17 06:19


Labs: 


 Abnormal Lab Results - Last 24 Hours (Table)











  09/16/17 09/16/17 09/17/17 Range/Units





  16:51 21:20 06:19 


 


Sodium    130 L  (137-145)  mmol/L


 


Chloride    95 L  ()  mmol/L


 


Creatinine    0.40 L  (0.52-1.04)  mg/dL


 


Glucose    123 H  (74-99)  mg/dL


 


POC Glucose (mg/dL)  126 H  173 H   (75-99)  mg/dL














  09/17/17 Range/Units





  06:22 


 


Sodium   (137-145)  mmol/L


 


Chloride   ()  mmol/L


 


Creatinine   (0.52-1.04)  mg/dL


 


Glucose   (74-99)  mg/dL


 


POC Glucose (mg/dL)  119 H  (75-99)  mg/dL














Assessment and Plan


(1) Asthma exacerbation


Status: Acute   





(2) CHF (congestive heart failure)


Status: Acute   





(3) CVA (cerebral vascular accident)


Status: Acute   





(4) Seizure disorder


Status: Acute   





(5) Chest pain


Status: Acute   





(6) Hyponatremia


Status: Acute   





(7) Cardiomyopathy


Status: Acute   





(8) HTN (hypertension)


Status: Acute   





(9) Hyperlipemia


Status: Acute   





(10) S/P cardiac cath


Status: Acute   


Plan: 





Plan dated 09/16/2017





The patient will have the medications labs and x-rays reviewed.  She'll need to 

be on corticosteroids and bronchodilators.  She should follow-up with the 

pulmonary clinic after discharge for pulmonary function testing.  She probably 

needs something more for her asthma chronically.  Also had montelukast 10 mg at 

bedtime.





Plan dated 09/17/2017





Labs x-rays and medications have all been reviewed.  Medications were adjusted 

yesterday.  She seems improved today.  Could go home today or tomorrow.  She'll 

need follow-up in our office for pulmonary function testing.  She has not seen 

a lung doctor for some time.  Can remember who she saw before.  Maybe one of us 

but not sure.  We'll continue to follow closely.  Prognosis is guarded.


Time with Patient: Less than 30

## 2017-09-17 NOTE — P.PN
Subjective





This is a pleasant 67-year-old  female patient with a past medical 

history significant for hypertension and status post ASD repair who presented 

to the hospital with chest discomfort that shortness of breath.





On arrival, the patient was hypertensive.  She also was found to be 

hyponatremic.





The patient was diagnosed with mild congestive heart failure exacerbation and 

she was started on a small dose of Lasix.  Also the blood pressure medications 

were adjusted.





The blood pressure continues to be uncontrolled and the dose of hydralazine was 

increased to 25 mg by mouth 3 times a day.  We'll continue monitor the blood 

pressure.





Objective





- Vital Signs


Vital signs: 


 Vital Signs











Temp  97.1 F L  09/17/17 08:00


 


Pulse  76   09/17/17 08:51


 


Resp  20   09/17/17 08:00


 


BP  197/76   09/17/17 08:00


 


Pulse Ox  97   09/17/17 08:38








 Intake & Output











 09/16/17 09/17/17 09/17/17





 18:59 06:59 18:59


 


Intake Total 735  


 


Output Total 425 300 


 


Balance 310 -300 


 


Weight  89 kg 


 


Intake:   


 


  Oral 735  


 


Output:   


 


  Urine 425 300 


 


Other:   


 


  Voiding Method Bedside Commode Bedside Commode 





 Bedpan Bedpan 





 Diaper Diaper 


 


  # Voids 1 1 


 


  # Bowel Movements 1  














- Constitutional


General appearance: Present: no acute distress





- Labs


CBC & Chem 7: 


 09/14/17 22:41





 09/17/17 06:19


Labs: 


 Abnormal Lab Results - Last 24 Hours (Table)











  09/16/17 09/16/17 09/17/17 Range/Units





  16:51 21:20 06:19 


 


Sodium    130 L  (137-145)  mmol/L


 


Chloride    95 L  ()  mmol/L


 


Creatinine    0.40 L  (0.52-1.04)  mg/dL


 


Glucose    123 H  (74-99)  mg/dL


 


POC Glucose (mg/dL)  126 H  173 H   (75-99)  mg/dL














  09/17/17 Range/Units





  06:22 


 


Sodium   (137-145)  mmol/L


 


Chloride   ()  mmol/L


 


Creatinine   (0.52-1.04)  mg/dL


 


Glucose   (74-99)  mg/dL


 


POC Glucose (mg/dL)  119 H  (75-99)  mg/dL














Assessment and Plan


Plan: 





Continue the current medical treatment.  Continue adjusting the blood pressure 

medication.  Continue following up with the patient

## 2017-09-17 NOTE — P.PN
Subjective


Patient was admitted for chest pain which appeared to be [place] skeletal patient 

also has hyponatremia from a hypervolemic hyponatremia for which patient is on 

oral Lasix at this time.  Patient is also being treated for asthma 

exacerbation.  Patient had tonic-clonic seizure today for which patient is 

being a valid by neurology and the CT of the head without contrast is being 

obtained to rule out any acute hemorrhage.





09/17/2017





Patient apparently had another episode of seizure today morning patient 

Trileptal dose was increased, neurology was made aware of her seizure.





Patient denied any fever, chills, chest pain, nausea, vomiting, shortness of 

breath today








Objective





- Vital Signs


Vital signs: 


 Vital Signs











Temp  98.1 F   09/17/17 12:00


 


Pulse  72   09/17/17 12:00


 


Resp  20   09/17/17 12:00


 


BP  194/80   09/17/17 12:00


 


Pulse Ox  98   09/17/17 12:00








 Intake & Output











 09/16/17 09/17/17 09/17/17





 18:59 06:59 18:59


 


Intake Total 735  


 


Output Total 425 300 


 


Balance 310 -300 


 


Weight  89 kg 


 


Intake:   


 


  Oral 735  


 


Output:   


 


  Urine 425 300 


 


Other:   


 


  Voiding Method Bedside Commode Bedside Commode 





 Bedpan Bedpan 





 Diaper Diaper 


 


  # Voids 1 1 


 


  # Bowel Movements 1  














- Exam


GENERAL: The patient is alert and oriented x3, not in any acute distress. Well 

developed, well nourished. 


HEENT: Pupils are round and equally reacting to light. EOMI. No scleral 

icterus. No conjunctival pallor. Normocephalic, atraumatic. No pharyngeal 

erythema. No thyromegaly. 


CARDIOVASCULAR: S1 and S2 present. No murmurs, rubs, or gallops. 


PULMONARY: Chest is clear to auscultation, no wheezing or crackles. 


ABDOMEN: Soft, nontender, nondistended, normoactive bowel sounds. No palpable 

organomegaly. 


MUSCULOSKELETAL: No joint swelling or deformity.


EXTREMITIES: No cyanosis, clubbing, or pedal edema. 


NEUROLOGICAL: Gross neurological examination did not reveal any focal deficits. 


SKIN: No rashes. 











- Labs


CBC & Chem 7: 


 09/14/17 22:41





 09/17/17 06:19


Labs: 


 Abnormal Lab Results - Last 24 Hours (Table)











  09/16/17 09/16/17 09/17/17 Range/Units





  16:51 21:20 06:19 


 


Sodium    130 L  (137-145)  mmol/L


 


Chloride    95 L  ()  mmol/L


 


Creatinine    0.40 L  (0.52-1.04)  mg/dL


 


Glucose    123 H  (74-99)  mg/dL


 


POC Glucose (mg/dL)  126 H  173 H   (75-99)  mg/dL














  09/17/17 09/17/17 Range/Units





  06:22 11:30 


 


Sodium    (137-145)  mmol/L


 


Chloride    ()  mmol/L


 


Creatinine    (0.52-1.04)  mg/dL


 


Glucose    (74-99)  mg/dL


 


POC Glucose (mg/dL)  119 H  128 H  (75-99)  mg/dL














Assessment and Plan


Plan: 


#1 chest pain: Rule out acute coronary syndromes patient has atypical chest 

pain etiology of which is unclear at this time.  Was evaluated by cardiology.


#2 hypovolemic hyponatremia: Patient is on oral Lasix, sodium today is 129


 #3 congestive heart failure chronic diastolic dysfunction with acute 

exacerbation Lasix as mentioned above.  And repeat lites and kidney function 

tomorrow.


#4 hypertension: Elevated blood pressures are secondary to salt tablets, 

patient will be continued on her home regimen along with increasing losartan 

from 5-20 monitor blood pressures.


#4 hyperlipidemia 


 #5 gastric residual reflux disease


#6 primary osteoarthritis


 #7 chronic low back pain 


 #8 depression


#9 patient had an episode of seizure: CT of the head neurology consult continue 

with Trileptal


#10 possible asthma exacerbation for which patient is on systemic steroids

## 2017-09-17 NOTE — P.CNNES
History of Present Illness


Consult date: 09/16/17


Reason for Consult: Patient with breakthrough seizures.


History of Present Illness: 





This patient is a 67-year-old right-handed white female who was brought into 

the emergency room at [facility] on 09/15/2017 for evaluation 

of seizure disorder and chest pain.  Patient had atypical chest pain symptoms 

and is to be evaluated by cardiology.  She was also noted to have evidence of 

moderate to severe hyponatremia with a serum sodium level of 129.  Patient was 

brought into the emergency room for further evaluation.  She was noted to be 

quite weak.  She underwent a computed tomography scan of the brain which 

revealed no acute intracranial abnormality.  There was no evidence of 

herniation or cerebral edema.  The patient was admitted to the hospital for 

further management.  Apparently while she was on the observation unit she had a 

witnessed seizure that lasted several minutes.  She was postictal.  She was 

sent for the computed tomography scan and then an extra dose of Trileptal was 

given to her today.  Patient has a known history of underlying seizure disorder 

which was diagnosed about 7 years ago and treated at the [facility].  She has been on Trileptal for many years as primary treatment 

of her seizure disorder.  A stat Trileptal level was drawn in the emergency 

room.  We are still awaiting the final result.  Apparently in January of this 

year she was admitted to the [facility] where she was 

treated for over 6 weeks.  They did attempt to wean her off Trileptal but could 

not and she developed severe breakthrough seizures.  She was placed black on 

the Trileptal at that time.  Patient states she has been having small partial 

seizures off-and-on for the past several months.  She tried to get into her 

primary care physician but was delayed due to the [place].  We will await further 

recommendations from nephrology regarding her kidney function.  The patient 

will rate prior daily monitoring of her serum sodium due to the hyponatremia.  

We will await further recommendations from nephrology in regards to her further 

treatment.  Patient is being treated for congestive heart failure as well and 

is currently on Lasix to help manage the heart failure condition.  The patient 

is resting comfortably this evening.  She denies any new symptoms.  She has no 

specific headache at this time.  We will continue to monitor progress closely 

during this admission.  This case was discussed at length with the patient in 

detail.  She is going to work with us in terms of long-term management.  

Neurology is now been consulted for further evaluation and recommendations.





Review of Systems


Constitutional: Denies chills, Denies fever


Eyes: denies blurred vision, denies pain


Ears, nose, mouth and throat: Denies headache, Denies sore throat


Cardiovascular: Denies chest pain, Denies shortness of breath


Respiratory: Denies cough


Gastrointestinal: Denies abdominal pain, Denies diarrhea, Denies nausea, Denies 

vomiting


Genitourinary: Denies dysuria, Denies hematuria


Musculoskeletal: Denies myalgias


Integumentary: Denies pruritus, Denies rash


Neurological: Reports ataxia, Reports balance difficulties, Reports change in 

mentation, Reports confusion, Reports convulsions, Reports head injury, Denies 

numbness, Denies weakness


Psychiatric: Denies anxiety, Denies depression


Endocrine: Denies fatigue, Denies weight change





Past Medical History


Past Medical History: Asthma, Chest Pain / Angina, Heart Failure, CVA/TIA, GERD/

Reflux, Hyperlipidemia, Hypertension, Osteoarthritis (OA), Seizure Disorder


Additional Past Medical History / Comment(s): recent anemia-transfused last 

month, SOB, dizziness, hx. hiatal hernia, herniated discs back, TIA years ago, 

last seizure 8 yrs. ago


History of Any Multi-Drug Resistant Organisms: None Reported


Past Surgical History: Heart Catheterization, Hysterectomy


Additional Past Surgical History / Comment(s): fatty tumor removed back, hole 

in heart repaired w/"patch"


Past Anesthesia/Blood Transfusion Reactions: Postoperative Nausea & Vomiting (

PONV)


Past Psychological History: Depression


Smoking Status: Never smoker


Past Alcohol Use History: None Reported


Past Drug Use History: None Reported





- Past Family History


  ** Mother


Family Medical History: Coronary Artery Disease (CAD)


Additional Family Medical History / Comment(s): heart problems





Medications and Allergies


 Home Medications











 Medication  Instructions  Recorded  Confirmed  Type


 


Albuterol Nebulized [Ventolin 2.5 mg INHALATION RT-Q4H PRN 07/08/15 09/14/17 

History





Nebulized]    


 


Losartan [Cozaar] 50 mg PO DAILY 07/08/15 09/14/17 History


 


OXcarbazepine [Trileptal] 600 mg PO BID 07/08/15 09/14/17 History


 


Omeprazole [PriLOSEC] 20 mg PO BID 07/08/15 09/14/17 History


 


ALPRAZolam [Xanax] 0.25 mg PO TID 07/10/15 09/14/17 History


 


ARIPiprazole [Abilify] 5 mg PO QAM 09/07/17 09/14/17 History


 


Albuterol Inhaler [Ventolin Hfa 1 - 2 puff INHALATION RT-Q6H PRN 09/07/17 09/14/ 17 History





Inhaler]    


 


Gabapentin 800 mg PO TID 09/07/17 09/14/17 History


 


Glycopyrrolate/Formoterol Fum 2 puff INHALATION RT-BID 09/07/17 09/14/17 History





[Bevespi Aerosphere Inhaler]    


 


HYDROmorphone HCL [Dilaudid] 4 mg PO Q4H 09/07/17 09/14/17 History


 


Mirtazapine 30 mg PO DAILY@2000 09/07/17 09/14/17 History


 


Promethazine 6.25MG/5Ml [Phenergan 6.25 mg PO AC-TID 09/07/17 09/14/17 History





Syrup]    


 


SILVER sulfADIAZINE Cream 1 applic TOPICAL BID 09/07/17 09/14/17 History





[Silvadene 1% Cream]    


 


Simvastatin [Zocor] 20 mg PO DAILY 09/07/17 09/14/17 History


 


guaiFENesin [Mucinex] 600 mg PO Q12H 09/07/17 09/14/17 History


 


Aspirin 81 mg PO DAILY #1 chewable 09/11/17 09/14/17 Rx


 


Furosemide [Lasix] 20 mg PO DAILY #30 tab 09/11/17 09/14/17 Rx


 


Sodium Chloride Tab 1.5 gm PO BID #60  09/11/17 09/14/17 Rx


 


Carvedilol [Coreg] 25 mg PO BID-W/MEALS 09/14/17 09/14/17 History


 


Melatonin 3 mg PO HS 09/14/17 09/14/17 History


 


Multivitamins, Thera [Multivitamin 1 tab PO DAILY 09/14/17 09/14/17 History





(formulary)]    











 Allergies











Allergy/AdvReac Type Severity Reaction Status Date / Time


 


codeine Allergy  Dyspnea, Verified 09/14/17 23:18





   Lip  





   Swelling  


 


wheat Allergy  Swelling Verified 09/14/17 22:07














Physical Examination





- Vital Signs


Vital Signs: 


 Vital Signs











  Temp Pulse Pulse Resp BP BP Pulse Ox


 


 09/16/17 15:15   60     


 


 09/16/17 15:05   56 L     


 


 09/16/17 13:07    67   161/86  


 


 09/16/17 12:00  97.9 F   57 L  16  150/79   97


 


 09/16/17 11:45   58 L   16   


 


 09/16/17 11:37   56 L   14   


 


 09/16/17 08:00  98.1 F   63  16   171/85  95


 


 09/16/17 07:16   63   14   


 


 09/16/17 07:05   63   14    93 L


 


 09/16/17 04:00  97.9 F   62  18   139/69  95


 


 09/16/17 02:57     18   


 


 09/15/17 23:50     18   


 


 09/15/17 22:50    52 L  18   102/52  95


 


 09/15/17 20:00     18   


 


 09/15/17 19:35   60     


 


 09/15/17 19:24   60     


 


 09/15/17 19:23  97.9 F   64  18   113/52  99


 


 09/15/17 16:00  98.1 F   55 L  18   119/57  99








 Intake and Output











 09/16/17 09/16/17 09/16/17





 06:59 14:59 22:59


 


Intake Total 120 735 


 


Output Total  425 


 


Balance 120 310 


 


Intake:   


 


  Oral 120 735 


 


Output:   


 


  Urine  425 


 


Other:   


 


  Voiding Method  Bedside Commode 





  Bedpan 





  Diaper 


 


  # Voids 1 1 


 


  # Bowel Movements  1 














- Constitutional


General appearance: average body habitus, cooperative





- EENT


EENT: PERRL, mucous membranes moist





- Respiratory


Respiratory: lungs clear, normal breath sounds





- Cardiovascular


Cardiovascular: regular rate, normal S1, normal S2


Extremities: no peripheral edema bilaterally





- Gastrointestinal


Gastrointestinal: normoactive bowel sounds





- Integumentary


Integumentary: normal





- Neurologic


Cranial nerve examination: PERRL, EOMI, VFF, V1/V2/V3 grossly intact, face 

symmetric, tongue midline, intact gag reflex, intact corneal reflex, normal 

palatal elevation


Speech examination: intact


Sensorimotor examination: intact


Motor examination - right side: 3/5: biceps, triceps, wrist flexion, wrist 

extension, , hip flexors, knee extensors, dorsiflexion, toe extension (EHL)

, plantarflexion


Motor examination - left side: 3/5: biceps, triceps, wrist flexion, wrist 

extension, , hip flexors, knee extensors, dorsiflexion, toe extension (EHL)

, plantarflexion


Detailed sensory examination: intact


Reflex and gait examination: intact


Reflexes: 1+: ankle, bicep, knee, tricep





- Musculoskeletal


Musculoskeletal: no pain





- Psychiatric


Psychiatric: mood/affect appropriate, cooperative





Results





- Laboratory Findings


CBC and BMP: 


 09/14/17 22:41





 09/16/17 06:28


Abnormal Lab Findings: 


 Abnormal Labs











  09/14/17 09/14/17 09/14/17





  22:41 22:41 22:41


 


Hgb  10.8 L  


 


RDW  16.0 H  


 


D-Dimer    0.60 H


 


Sodium   128 L 


 


Chloride   94 L 


 


BUN   6 L 


 


Creatinine   0.48 L 


 


Calcium   


 


Total Protein   6.2 L 


 


HDL Cholesterol   














  09/15/17 09/16/17





  13:11 06:28


 


Hgb  


 


RDW  


 


D-Dimer  


 


Sodium  130 L  129 L


 


Chloride   97 L


 


BUN  


 


Creatinine   0.47 L


 


Calcium   8.3 L


 


Total Protein  


 


HDL Cholesterol   65 H














Assessment and Plan


(1) Seizure disorder


Status: Acute   Code(s): G40.909 - EPILEPSY, UNSP, NOT INTRACTABLE, WITHOUT 

STATUS EPILEPTICUS   





(2) Hyponatremia


Status: Acute   Code(s): E87.1 - HYPO-OSMOLALITY AND HYPONATREMIA   





(3) Anemia


Status: Acute   Code(s): D64.9 - ANEMIA, UNSPECIFIED   





(4) Cardiomyopathy


Status: Acute   Code(s): I42.9 - CARDIOMYOPATHY, UNSPECIFIED   


Plan: 





This patient is a 67-year-old female who was admitted to hospital for symptoms 

of seizure disorder and recent hyponatremia.  Patient was also complaining of 

atypical chest pain.  She was seen in the ER and underwent a computed 

tomography scan of the brain which failed to reveal any evidence of acute 

stroke or hemorrhage.  She was subsequent admitted to Hospital.  Her serum 

sodium today is 129.  She has been taking Trileptal 600 mg twice a day as her 

primary anticonvulsant medication.  She is to be seen by nephrology for further 

assessment of her renal function.  We'll await their input in terms of long-

term management of her seizures with Trileptal.  We will continue close 

neurological follow-up of this patient during this admission.  Her overall 

prognosis at this time remains very guarded.


Time with Patient: Greater than 30

## 2017-09-17 NOTE — P.PN
Subjective





This patient is a 67-year-old right-handed white female who is seen in 

neurology consultation yesterday for evaluation of seizure disorder.  Patient 

was noted yesterday as having severe hyponatremia with a serum sodium level of 

129.  She had a witnessed seizure yesterday and was transferred to the 

selective care floor.  Her serum sodium today is 130.  She is being seen by 

nephrology were monitoring her hyponatremia.  She has a component of SIADH 

syndrome particularly due to the use of her Trileptal and Remeron.  Her serum 

sodium level today is 130.  It has been noted that the patient was admitted to 

[facility] in January of this year.  They 

attempted to wean her off her Trileptal unsuccessfully.  She was restarted on 

Trileptal his primary treatment for her seizure disorder.  We would recommend 

that the patient follow the directions of nephrology with the use of salt 

tablets.  Hopefully she can maintain her serum sodium level above 134.  She is 

scheduled for routine EEG tomorrow which will be reviewed.  Continue close 

monitoring of her blood pressure readings.  Her overall prognosis at this time 

remains guarded.  The patient has had some difficulty with her breathing and is 

on corticosteroids and bronchodilators.  She will need pulmonary function 

testing done in the outpatient clinic.  The patient neurologically has not had 

any seizures today.  She is to continue on her current dose of Trileptal.  We 

are still awaiting the Trileptal blood level to return from the laboratory.  

Overall prognosis at this time remains guarded.





Objective





- Vital Signs


Vital signs: 


 Vital Signs











Temp  97.1 F L  09/17/17 15:48


 


Pulse  70   09/17/17 16:26


 


Resp  20   09/17/17 15:48


 


BP  165/72   09/17/17 15:48


 


Pulse Ox  97   09/17/17 16:10








 Intake & Output











 09/16/17 09/17/17 09/17/17





 18:59 06:59 18:59


 


Intake Total 735  


 


Output Total 425 300 150


 


Balance 310 -300 -150


 


Weight  89 kg 


 


Intake:   


 


  Oral 735  


 


Output:   


 


  Urine 425 300 150


 


Other:   


 


  Voiding Method Bedside Commode Bedside Commode 





 Bedpan Bedpan 





 Diaper Diaper 


 


  # Voids 1 1 


 


  # Bowel Movements 1  














- Exam





Physical examination:





PHYSICAL EXAMINATION: Patient is resting comfortably in bed. 


VITAL SIGNS: Blood pressure is [165/72]. Heart rate is [72]. Respiration is [20]

. Temperature is [].


HEENT: Head is atraumatic, neck is supple, there were no carotid bruits.  97.1


CHEST: Lungs are clear to auscultation and percussion.  


CARDIAC: S1, S2 normal rate and rhythm. There is no murmur.


ABDOMEN: Soft and nontender. Bowel sounds are present.


EXTREMITIES:  There is no pedal edema.  Peripheral pulses are present.





Neurological examination:





Patient's neurological examination is unchanged from yesterday.





- Labs


CBC & Chem 7: 


 09/14/17 22:41





 09/17/17 06:19


Labs: 


 Abnormal Lab Results - Last 24 Hours (Table)











  09/16/17 09/17/17 09/17/17 Range/Units





  21:20 06:19 06:22 


 


Sodium   130 L   (137-145)  mmol/L


 


Chloride   95 L   ()  mmol/L


 


Creatinine   0.40 L   (0.52-1.04)  mg/dL


 


Glucose   123 H   (74-99)  mg/dL


 


POC Glucose (mg/dL)  173 H   119 H  (75-99)  mg/dL














  09/17/17 09/17/17 Range/Units





  11:30 16:42 


 


Sodium    (137-145)  mmol/L


 


Chloride    ()  mmol/L


 


Creatinine    (0.52-1.04)  mg/dL


 


Glucose    (74-99)  mg/dL


 


POC Glucose (mg/dL)  128 H  134 H  (75-99)  mg/dL














Assessment and Plan


(1) Seizure disorder


Status: Acute   Code(s): G40.909 - EPILEPSY, UNSP, NOT INTRACTABLE, WITHOUT 

STATUS EPILEPTICUS   





(2) Hyponatremia


Status: Acute   Code(s): E87.1 - HYPO-OSMOLALITY AND HYPONATREMIA   





(3) Anemia


Status: Acute   Code(s): D64.9 - ANEMIA, UNSPECIFIED   





(4) Cardiomyopathy


Status: Acute   Code(s): I42.9 - CARDIOMYOPATHY, UNSPECIFIED   


Plan: 





This patient is a 67-year-old female who was admitted to hospital for symptoms 

of seizure disorder and recent hyponatremia.  Patient was also complaining of 

atypical chest pain.  She was seen in the ER and underwent a computed 

tomography scan of the brain which failed to reveal any evidence of acute 

stroke or hemorrhage.  She was subsequent admitted to Hospital.  Her serum 

sodium today is 129.  She has been taking Trileptal 600 mg twice a day as her 

primary anticonvulsant medication.  She is to be seen by nephrology for further 

assessment of her renal function.  We will await their input in terms of long-

term management of her seizures with Trileptal.  Her Trileptal blood level was 

drawn this morning and this pending at this time.  As noted the patient has 

been attempted to wean off of Trileptal in the past unsuccessfully.  We will 

continue close neurological follow-up of this patient during this admission.  

Her overall prognosis at this time remains very guarded.

## 2017-09-18 NOTE — XR
EXAMINATION TYPE: XR chest 1V portable

 

DATE OF EXAM: 9/18/2017

 

COMPARISON: Prior chest x-ray 9/14/2017

 

HISTORY: Shortness of breath

 

TECHNIQUE: Single frontal view of the chest is obtained.

 

FINDINGS:  Patient is rotated. The heart remains enlarged. There are overlying cardiac leads. No airs
pace disease, pneumothorax, or pleural effusion.

 

IMPRESSION:  Cardiomegaly

## 2017-09-18 NOTE — P.PN
Progress Note - Text





DATE OF SERVICE: 


09/18/2017





PRESENTING COMPLAINT:


Chest pain, shortness of breath





HISTORY OF PRESENT ILLNESS:


67-year-old female presented with chest pressure like sensation, and shortness 

of breath.  Has a history of SIADH with a recent hospital admission for the 

same.  On September 16 and 17th patient had tonic-clonic seizure for which 

neurology was consulted CT of the head was ordered.  Patient does also have a 

history of seizure activity.





INTERVAL HISTORY:


Today patient lying in bed, appears tired, seizure precautions in place, 

scheduled for an EEG today.  No further seizure activity reported.  Patient is 

ambulatory to with assistance to the bathroom, tolerating her diet and eating 

about 50% of each meal.  Blood pressure remains labile running systolic 102-194

, diastolic 52-96.  Neurology, nephrology and cardiology continue to follow.








REVIEW OF SYSTEMS:


Done for constitutional ,cardiovascular, GI, pulmonary with relevant findings 

as above.





CURRENT MEDICATIONS


Xanax, aspirin, Lipitor, Coreg 25 mg by mouth twice a day, Lasix 20 mg by mouth 

twice a day, Neurontin 800 mg by mouth 3 times a day, hydralazine 25 mg by 

mouth 3 times a day, Cozaar 100 mg by mouth daily, Remeron 30 mg by mouth daily 

Trileptal 600 mg by mouth twice a day, prednisone 40 mg by mouth daily








PHYSICAL EXAM


VITAL SIGNS: 


Temperature 97.5, pulse 61, respiratory rate 18, blood pressure 133/63, oxygen 

saturation 96% on room air.





GENERAL APPEARANCE: Lying in bed, tired appearing. 


EYES: Pupils equal. Conjunctiva normal. 


NECK: JVD not raised. Mass not palpable. 


RESPIRATORY: Respiratory effort normal. Lungs clear to auscultation. 


CARDIOVASCULAR: First and second sounds normal. No edema. 


ABDOMEN: Soft. Liver and spleen not palpable. No tenderness. No mass palpable. 


PSYCHIATRY: Alert and oriented x3. Mood and affect normal. 


NEUROLOGICAL: Cranial nerves grossly intact. No facial asymmetry.  Power right 

side 3/5, power left side 3/5,





INVESTIGATIONS:


Sodium 129, chloride 96, BUN 18, creatinine 0.55, Accu-Cheks noted.  Urine 

random sodium 116, urine osmolality 264.


Chest x-ray: Cardiomegaly


ASSESSMENT:


-SIADH with hypoosmolar hyponatremia, chronic


-Chronic obstructive asthma, doesn't appear to be in acute exacerbation was-GERD

-hyperlipidemia


-Essential hypertension


-Hiatal hernia neck-chronic cardiac.  This can lower back pain-depression 

otherwise specified


-Gait dysfunction uses a wheelchair at baseline


-Acute on chronic seizure disorder


-Acute on chronic congestive heart exacerbation from dust or dysfunction EF at 

4060%, from hypertensive heart disease


-Moderate mitral regurgitation, non-rheumatic





PLAN:


Waiting for Trileptal levels to return, EEG pending, no further seizure activity

, continue to adjust antihypertensives, his blood pressure continues to be 

labile.  Plan of care was discussed with the patient at bedside and she is in 

agreement we'll continue to monitor closely





NP statement: Patient was seen and examined by nurse practitioner Skye Sanchez and all elements of the case discussed with attending  Dr. Tuttle

## 2017-09-18 NOTE — P.PN
Subjective





Patient seen in follow-up for hyponatremia.  Sodium level is 129 this morning.  

She did have an episode of tonic-clonic seizure that was witnessed by the 

nurses on September 16.  She is maintained on anticonvulsants.  Currently she 

is resting in bed.  She is awake and alert.  Continues to have shaking of her 

arm intermittently.  No vomiting or diarrhea.  Blood pressures have been labile 

better controlled.  She went for an EEG this morning.  She states her breathing 

is more labored this morning.  





Vital signs are stable.


General: The patient appeared well nourished and normally developed. 


HEENT: Head exam is unremarkable. Neck is without jugular venous distension.


LUNGS: Lungs are clear to auscultation and percussion. Breath sounds decreased.


HEART: Rate and Rhythm are regular. First and second heart sounds normal. No 

murmurs, rubs or gallops. 


ABDOMEN: Abdominal exam reveals normal bowel sounds. Non-tender and non-

distended. No evidence of peritonitis.


EXTREMITITES: No clubbing, cyanosis, or edema.





Objective





- Vital Signs


Vital signs: 


 Vital Signs











Temp  97.5 F L  09/18/17 08:10


 


Pulse  64   09/18/17 08:11


 


Resp  18   09/18/17 08:10


 


BP  133/63   09/18/17 08:10


 


Pulse Ox  96   09/18/17 08:10








 Intake & Output











 09/17/17 09/18/17 09/18/17





 18:59 06:59 18:59


 


Intake Total 50  


 


Output Total 150 300 250


 


Balance -100 -300 -250


 


Weight  84.5 kg 


 


Intake:   


 


  Oral 50  


 


Output:   


 


  Urine 150 300 250


 


Other:   


 


  Voiding Method  Bedside Commode Bedpan





  Bedpan Diaper





  Diaper 














- Labs


CBC & Chem 7: 


 09/14/17 22:41





 09/18/17 05:27


Labs: 


 Abnormal Lab Results - Last 24 Hours (Table)











  09/17/17 09/17/17 09/17/17 Range/Units





  11:30 16:42 20:50 


 


Sodium     (137-145)  mmol/L


 


Chloride     ()  mmol/L


 


BUN     (7-17)  mg/dL


 


POC Glucose (mg/dL)  128 H  134 H  127 H  (75-99)  mg/dL














  09/18/17 09/18/17 Range/Units





  05:27 05:32 


 


Sodium  129 L   (137-145)  mmol/L


 


Chloride  96 L   ()  mmol/L


 


BUN  18 H   (7-17)  mg/dL


 


POC Glucose (mg/dL)   102 H  (75-99)  mg/dL














Assessment and Plan


Plan: 





Assessment:


#1.  Hyponatremia, slightly hypervolemic in nature as evidenced by mild 

pulmonary edema on CTA.  However also component of SIADH particularly from 

Trileptal and Remeron.  Sodium level 129. Recent TSH was normal.  Uric acid 

level was low which can be seen in SIADH due to increased uric acid clearance.


#2.  4.2 cm thoracic aortic aneurysm.


#3.  Benign hypertension.  Labile.


#4.  Seizures.  Brain CT benign.





Plan:


Increase Lasix to 20 mg twice daily.


Check chest x-ray today - if worsening volume status then will give her 40 mg 

IV Lasix in addition.


I strongly advised to follow a low-salt and 50 oz fluid restricted diet.


Continue salt tabs 1 g twice daily - this will need to be decreased further 

depending on her volume status and her sodium level.


May also need to consider alternatives for Trileptal and Remeron - neurology 

following.


Continue current antihypertensives - Hydralazine has been added as a scheduled 

dose as well as additional when necessary if systolic blood pressure greater 

than 160.  She will benefit from scheduled antihypertensives, particularly beta 

blocker or calcium channel blocker if blood pressures remain persistently 

elevated.  Her pressures have been quite labile at this time. 


Neurology following.  Follow-up EEG.

## 2017-09-18 NOTE — EEG
ELECTROENCEPHALOGRAM REPORT



DATE OF EE2017.



ELECTROENCEPHALOGRAPHIC EXAMINATION:



INDICATION FOR EXAMINATION:

This patient is a 67-year-old female, admitted for breakthrough seizures.  Patient

admitted with severe hyponatremia and SIADH syndrome.  Patient currently on Trileptal

for anticonvulsant therapy.  Serum sodium level is low at 129.  The EEG to rule out

seizure focus.



[age].



EEG FINDINGS:

A routine 21 channel awake digital EEG recording was accomplished utilizing the 10-20

international system with bipolar and referential montages.  The background activity in

the most alert resting state consists of a low to medium amplitude, fairly well

developed and well sustained 6-7 Hz activity over the posterior head regions.  This

posterior rhythm attenuates to eye opening.  There is a small amount of low amplitude

18-20 Hz beta activity seen maximally over the anterior head regions.  Muscle and

movement artifact was observed on a few occasions during the tracing.



Hyperventilation was not performed.  Photic stimulation at flash frequencies of 2-30 Hz

produced a minimal occipital driving response.  No epileptiform discharges were seen.



IMPRESSION:

This EEG is moderately abnormal in a diffuse fashion due to slowing of the EEG

background.  The EEG failed to reveal any focal, lateralized, or epileptiform

abnormalities.  Clinical correlation is recommended.





MMODL / IJN: [id number] / Job#: [id number]

## 2017-09-18 NOTE — P.PN
Subjective





67-year-old female patient is being seen in follow-up regarding her bronchial 

asthma.  The patient is doing well and she has no specific respiratory 

difficulties.  She is resting comfortably in bed.  No cough or sputum 

production.  No chest tightness or wheezing.  No pleurisy.  No hemoptysis.  She 

is hospitalized for some chest pain and she is under the care of cardiology and 

medicine.  No active angina or chest pain at this point.  No palpitations.  The 

patient also has a history of congestion heart failure which is currently 

inactive in stable and it well compensated for now.  She runs a lower sodium 

level and her most recent sodium level is at 129 and this is probably drug-

induced knowing that several medications the patient takes for seizure could've 

contributed to this lower sodium level.  In any rate, no change in mental 

status.  No seizure activity.  No other complaints otherwise for now.  In 

regards to her bronchial asthma, the patient is on Symbicort pH is also on 

daily Lasix 20 mg by mouth twice a day.  She is completing a course of 

prednisone burst taper treating an acute asthma exacerbation that was diagnosed 

at a time of her admission.





Objective





- Vital Signs


Vital signs: 


 Vital Signs











Temp  97.4 F L  09/18/17 11:10


 


Pulse  64   09/18/17 11:43


 


Resp  18   09/18/17 11:10


 


BP  168/75   09/18/17 11:10


 


Pulse Ox  99   09/18/17 11:10








 Intake & Output











 09/17/17 09/18/17 09/18/17





 18:59 06:59 18:59


 


Intake Total 50  800


 


Output Total 150 300 650


 


Balance -100 -300 150


 


Weight  84.5 kg 


 


Intake:   


 


  Oral 50  800


 


Output:   


 


  Urine 150 300 650


 


Other:   


 


  Voiding Method  Bedside Commode Bedpan





  Bedpan Diaper





  Diaper 














- Exam





Head exam was generally normal. There was no scleral icterus or corneal arcus. 

Mucous membranes were moist.Neck was supple and without jugular venous 

distension, thyromegaly, or carotid bruits. Carotids were easily palpable 

bilaterally. There was no adenopathy.  Lung sounds are diminished bilaterally.  

No wheezes overall currently crackles.Cardiac exam revealed the PMI to be 

normally situated and sized. The rhythm was regular and no extrasystoles were 

noted during several minutes of auscultation. The first and second heart sounds 

were normal and physiologic splitting of the second heart sound was noted. 

There were no murmurs, rubs, clicks, or gallops.Abdominal exam revealed normal 

bowel sounds. The abdomen was soft, non-tender, and without masses, organomegaly

, or appreciable enlargement of the abdominal aorta.Examination of the 

extremities revealed easily palpable radial, femoral and pedal pulses. There 

was no cyanosis, clubbing or edema.  Neurologically the patient is alert and 

awake and she is following commands and answering questions appropriately.  

Skin examination is within normal limits and there is no wounds or rashes of 

any ulceration.  Skeletal exam is within normal.  Psychiatric exam is within 

normal limits and the patient is currently on Abilify for depression.





- Labs


CBC & Chem 7: 


 09/14/17 22:41





 09/18/17 05:27


Labs: 


 Abnormal Lab Results - Last 24 Hours (Table)











  09/17/17 09/17/17 09/18/17 Range/Units





  16:42 20:50 05:27 


 


Sodium    129 L  (137-145)  mmol/L


 


Chloride    96 L  ()  mmol/L


 


BUN    18 H  (7-17)  mg/dL


 


POC Glucose (mg/dL)  134 H  127 H   (75-99)  mg/dL


 


Osmolality     (280-301)  mosm/kg


 


Ur Random Sodium     (30-90)  mmol/L














  09/18/17 09/18/17 09/18/17 Range/Units





  05:27 05:32 10:40 


 


Sodium     (137-145)  mmol/L


 


Chloride     ()  mmol/L


 


BUN     (7-17)  mg/dL


 


POC Glucose (mg/dL)   102 H   (75-99)  mg/dL


 


Osmolality  271 L    (280-301)  mosm/kg


 


Ur Random Sodium    116 H  (30-90)  mmol/L














  09/18/17 Range/Units





  12:02 


 


Sodium   (137-145)  mmol/L


 


Chloride   ()  mmol/L


 


BUN   (7-17)  mg/dL


 


POC Glucose (mg/dL)  116 H  (75-99)  mg/dL


 


Osmolality   (280-301)  mosm/kg


 


Ur Random Sodium   (30-90)  mmol/L














Assessment and Plan


Plan: 





Assessment





1 acute bronchial asthma exacerbation, improving and the patient has improved 

significantly over the past 24 hours and currently she is on Symbicort 

maintenance and completing a prednisone burst taper.





2 CVA, history of





3 seizure disorder, history of currently inactive in stable





4 chronic hyponatremia with a sodium level of 129, probably drug-induced





5 CHF or compensated for now, and the echocardiogram showed no evidence of any 

significant pulmonary hypertension and the patient has mild concentric left 

ventricular hypertrophy with an ejection fraction of 55-60%.  No significant 

valvular heart disease.





6 chest pain currently under investigation, likely atypical





7 hypertension





8 hyperlipidemia





9 coronary artery disease with previous history of cardiac catheterization and 

she has disease involving the RCA.  





Plan





Complete the course of prednisone burst taper.  Suggest a 12 day course with 10 

mg taper every 3 days.  Continue Symbicort as maintenance.  Provide the patient 

incentive spirometer.  Early mobility.  Cardiology follow-up regarding the 

chest pain.  Monitor sodium level.  We'll follow.

## 2017-09-18 NOTE — P.PN
Progress Note - Text





Attending note.


Date of service-09/18/2017


This patient was seen and examined by me .  Discussed the patient with my nurse 

practitioner Ms. Sanchez.


Admitted with chest pain.  Felt to be noncardiac.  Also being treated for 

seizures per neurology.





On examination:


Lungs-clear, no edema.


Investigations:


Sodium 129 serum osmolalities 271 urine osmolality is 264


Assessment and plan:


-SIADH with hypoosmolar hyponatremia, chronic


-Chronic obstructive asthma, doesn't appear to be in acute exacerbation was-GERD

-hyperlipidemia


-Essential hypertension


-Hiatal hernia neck-chronic cardiac.  This can lower back pain-depression 

otherwise specified


-Gait dysfunction uses a wheelchair at baseline


-Acute on chronic seizure disorder


-Acute on chronic congestive heart exacerbation from dust or dysfunction EF at 

4060%, from hypertensive heart disease


-Moderate mitral regurgitation, non-rheumatic


Plan:


Discussed with Dr. Rust.  Based on critical exam and discussion no evidence 

of any acute asthma exacerbation.  X-ray does not show any overt CHF.  Lasix 

per cardiology.  Await further input from them.  Trileptal being adjusted by 

Dr. Landa.  Care was discussed with the patient

## 2017-09-18 NOTE — P.PN
Subjective


Principal diagnosis: 





Uncontrolled hypertension


This is a 67-year-old  female with history of prior ASD repair 

hypertension, recurrent hyponatremia, diastolic heart failure, cardiac catheter 

performed in July 2015 did not reveal any significant obstructive coronary 

artery disease.  Blood pressure this morning under much better control, 133/63 

with a heart rate in the 60s.  Sodium this morning is 129.  Patient does 

complain this morning of feeling somewhat short of breath.  Scheduled for EEG 

today.  Chest x-rays been performed which is yet pending.  Dose of Lasix 

increased by nephrology today.








Objective





- Vital Signs


Vital signs: 


 Vital Signs











Temp  97.5 F L  09/18/17 08:10


 


Pulse  64   09/18/17 08:11


 


Resp  18   09/18/17 08:10


 


BP  133/63   09/18/17 08:10


 


Pulse Ox  96   09/18/17 08:10








 Intake & Output











 09/17/17 09/18/17 09/18/17





 18:59 06:59 18:59


 


Intake Total 50  


 


Output Total 150 300 250


 


Balance -100 -300 -250


 


Weight  84.5 kg 


 


Intake:   


 


  Oral 50  


 


Output:   


 


  Urine 150 300 250


 


Other:   


 


  Voiding Method  Bedside Commode Bedpan





  Bedpan Diaper





  Diaper 














- Exam





PHYSICAL EXAMINATION: 





HEENT: Head is atraumatic, normocephalic.  Pupils equal, round.  Neck is 

supple.  There is no elevated jugular venous pressure.





HEART EXAMINATION: Heart S1, S2 normal.  No murmur or gallop heard.





CHEST EXAMINATION: Lungs are clear with diminished air entry to bilateral bases.





ABDOMEN:  Soft, nontender. Bowel sounds are heard. No organomegaly noted.


 


EXTREMITIES:[ 2+ peripheral pulses with no evidence of peripheral edema and no 

calf tenderness noted].





NEUROLOGIC [patient is awake, alert and oriented -3.]


 


.


 








- Labs


CBC & Chem 7: 


 09/14/17 22:41





 09/18/17 05:27


Labs: 


 Abnormal Lab Results - Last 24 Hours (Table)











  09/17/17 09/17/17 09/17/17 Range/Units





  11:30 16:42 20:50 


 


Sodium     (137-145)  mmol/L


 


Chloride     ()  mmol/L


 


BUN     (7-17)  mg/dL


 


POC Glucose (mg/dL)  128 H  134 H  127 H  (75-99)  mg/dL














  09/18/17 09/18/17 Range/Units





  05:27 05:32 


 


Sodium  129 L   (137-145)  mmol/L


 


Chloride  96 L   ()  mmol/L


 


BUN  18 H   (7-17)  mg/dL


 


POC Glucose (mg/dL)   102 H  (75-99)  mg/dL














Assessment and Plan


(1) Accelerated hypertension


Status: Acute   





(2) Seizure


Status: Acute   





(3) Thoracic aortic aneurysm


Status: Acute   





(4) Hyponatremia


Status: Acute   





(5) HTN (hypertension)


Status: Acute   





(6) Hyperlipemia


Status: Acute   


Plan: 


From cardiology's perspective, we'll continue the patient on her current 

medications.  We will review a chest x-ray, patient may require a small dose of 

IV Lasix.  Further recommendations to follow.





DNP note has been reviewed, I agree with a documented findings and plan of 

care.  Patient was seen and examined.

## 2017-09-19 NOTE — P.PN
Subjective





Patient seen in follow-up for hyponatremia.  Sodium level is 132 this morning.  

She did have an episode of tonic-clonic seizure that was witnessed by the 

nurses on September 16.  She is maintained on anticonvulsants.  Currently she 

is resting in bed.  She is awake and alert.  Continues to have shaking of her 

arm intermittently but improved overall.  No vomiting or diarrhea.  Blood 

pressures have been labile but better controlled.  EKG revealed diffuse 

abnormality.  Chest x-ray revealed no evidence of fluid overload.  She does get 

dyspneic even with minimal exertion.  





Vital signs are stable.


General: The patient appeared well nourished and normally developed. 


HEENT: Head exam is unremarkable. Neck is without jugular venous distension.


LUNGS: Lungs are clear to auscultation and percussion. Breath sounds decreased.


HEART: Rate and Rhythm are regular. First and second heart sounds normal. No 

murmurs, rubs or gallops. 


ABDOMEN: Abdominal exam reveals normal bowel sounds. Non-tender and non-

distended. No evidence of peritonitis.


EXTREMITITES: No clubbing, cyanosis, or edema.





Objective





- Vital Signs


Vital signs: 


 Vital Signs











Temp  97.3 F L  09/19/17 00:00


 


Pulse  64   09/19/17 08:53


 


Resp  17   09/19/17 04:00


 


BP  157/69   09/19/17 04:00


 


Pulse Ox  99   09/19/17 04:00








 Intake & Output











 09/18/17 09/19/17 09/19/17





 18:59 06:59 18:59


 


Intake Total 800  


 


Output Total 850 100 


 


Balance -50 -100 


 


Weight  88.5 kg 


 


Intake:   


 


  Oral 800  


 


Output:   


 


  Urine 850 100 


 


Other:   


 


  Voiding Method Bedpan Bedpan 





 Diaper  


 


  # Voids 1 2 














- Labs


CBC & Chem 7: 


 09/14/17 22:41





 09/19/17 05:53


Labs: 


 Abnormal Lab Results - Last 24 Hours (Table)











  09/18/17 09/18/17 09/18/17 Range/Units





  05:27 10:40 12:02 


 


Sodium     (137-145)  mmol/L


 


Potassium     (3.5-5.1)  mmol/L


 


BUN     (7-17)  mg/dL


 


POC Glucose (mg/dL)    116 H  (75-99)  mg/dL


 


Osmolality  271 L    (280-301)  mosm/kg


 


Magnesium     (1.6-2.3)  mg/dL


 


Ur Random Sodium   116 H   (30-90)  mmol/L














  09/18/17 09/18/17 09/19/17 Range/Units





  16:24 21:21 05:53 


 


Sodium    132 L  (137-145)  mmol/L


 


Potassium    3.3 L  (3.5-5.1)  mmol/L


 


BUN    22 H  (7-17)  mg/dL


 


POC Glucose (mg/dL)  166 H  120 H   (75-99)  mg/dL


 


Osmolality     (280-301)  mosm/kg


 


Magnesium    1.5 L  (1.6-2.3)  mg/dL


 


Ur Random Sodium     (30-90)  mmol/L














  09/19/17 Range/Units





  06:03 


 


Sodium   (137-145)  mmol/L


 


Potassium   (3.5-5.1)  mmol/L


 


BUN   (7-17)  mg/dL


 


POC Glucose (mg/dL)  102 H  (75-99)  mg/dL


 


Osmolality   (280-301)  mosm/kg


 


Magnesium   (1.6-2.3)  mg/dL


 


Ur Random Sodium   (30-90)  mmol/L














Assessment and Plan


Plan: 





Assessment:


#1.  Hyponatremia, slightly hypervolemic in nature as evidenced by mild 

pulmonary edema on CTA.  Chest x-ray from yesterday did not reveal significant 

volume overload.  However also component of SIADH particularly from Trileptal 

and Remeron.  Sodium level 132. Recent TSH was normal.  Uric acid level was low 

which can be seen in SIADH due to increased uric acid clearance.


#2.  4.2 cm thoracic aortic aneurysm.


#3.  Benign hypertension.  Labile.


#4.  Seizures.  Brain CT benign.


#5.  Hypokalemia secondary to diuresis.


#6.  Hypomagnesemia secondary to diuresis.





Plan:


Continue  Lasix to 20 mg twice daily - this will help with better blood 

pressure control as well as diluting the urine to help excrete more water. 


I strongly advised to follow a low-salt and 50 oz fluid restricted diet.


Further decrease salt tabs to 1 g once daily. 


May also need to consider alternatives for Trileptal and Remeron - neurology 

following.


Continue current antihypertensives - Hydralazine has been added as a scheduled 

dose as well as additional when necessary if systolic blood pressure greater 

than 160.  She will benefit from scheduled antihypertensives, particularly beta 

blocker or calcium channel blocker if blood pressures remain persistently 

elevated.  Her pressures have been quite labile at this time.  She is also on 

prednisone which can raise the blood pressure.


Neurology following.  


Potassium and magnesium both being replaced.

## 2017-09-19 NOTE — P.PN
Subjective





67-year-old female patient is being seen in follow-up regarding her bronchial 

asthma.  The patient is doing well and she has no specific respiratory 

difficulties.  She is resting comfortably in bed.  No cough or sputum 

production.  No chest tightness or wheezing.  No pleurisy.  No hemoptysis.  She 

is hospitalized for some chest pain and she is under the care of cardiology and 

medicine.  No active angina or chest pain at this point.  No palpitations.  The 

patient also has a history of congestion heart failure which is currently 

inactive in stable and it well compensated for now.  She runs a lower sodium 

level and her most recent sodium level is at 129 and this is probably drug-

induced knowing that several medications the patient takes for seizure could've 

contributed to this lower sodium level.  In any rate, no change in mental 

status.  No seizure activity.  No other complaints otherwise for now.  In 

regards to her bronchial asthma, the patient is on Symbicort pH is also on 

daily Lasix 20 mg by mouth twice a day.  She is completing a course of 

prednisone burst taper treating an acute asthma exacerbation that was diagnosed 

at a time of her admission.





On 9 19,017 the patient is stable.  The patient not having any major 

respiratory difficulties.  She is calm and comfortable perintake and cough 

sputum production chest that is so wheezing.  She will be taken off this IV Solu

-Medrol and the patient was placed on a prednisone burst taper.  No headaches.  

No change in mental status patient is resting comfortably in bed.  No 

hemoptysis.  No pleurisy.  Cardiology is also on the case.





Objective





- Vital Signs


Vital signs: 


 Vital Signs











Temp  97.8 F   09/19/17 11:36


 


Pulse  72   09/19/17 12:17


 


Resp  18   09/19/17 11:42


 


BP  128/73   09/19/17 11:36


 


Pulse Ox  96   09/19/17 11:36








 Intake & Output











 09/18/17 09/19/17 09/19/17





 18:59 06:59 18:59


 


Intake Total 800  120


 


Output Total 850 100 700


 


Balance -50 -100 -580


 


Weight  88.5 kg 


 


Intake:   


 


  Oral 800  120


 


Output:   


 


  Urine 850 100 700


 


Other:   


 


  Voiding Method Bedpan Bedpan Bedpan





 Diaper  


 


  # Voids 1 2 1














- Exam





Head exam was generally normal. There was no scleral icterus or corneal arcus. 

Mucous membranes were moist.Neck was supple and without jugular venous 

distension, thyromegaly, or carotid bruits. Carotids were easily palpable 

bilaterally. There was no adenopathy.  Lung sounds are diminished bilaterally.  

No wheezes overall currently crackles.Cardiac exam revealed the PMI to be 

normally situated and sized. The rhythm was regular and no extrasystoles were 

noted during several minutes of auscultation. The first and second heart sounds 

were normal and physiologic splitting of the second heart sound was noted. 

There were no murmurs, rubs, clicks, or gallops.Abdominal exam revealed normal 

bowel sounds. The abdomen was soft, non-tender, and without masses, organomegaly

, or appreciable enlargement of the abdominal aorta.Examination of the 

extremities revealed easily palpable radial, femoral and pedal pulses. There 

was no cyanosis, clubbing or edema.  Neurologically the patient is alert and 

awake and she is following commands and answering questions appropriately.  

Skin examination is within normal limits and there is no wounds or rashes of 

any ulceration.  Skeletal exam is within normal.  Psychiatric exam is within 

normal limits and the patient is currently on Abilify for depression.





- Labs


CBC & Chem 7: 


 09/14/17 22:41





 09/19/17 05:53


Labs: 


 Abnormal Lab Results - Last 24 Hours (Table)











  09/18/17 09/18/17 09/19/17 Range/Units





  16:24 21:21 05:53 


 


Sodium    132 L  (137-145)  mmol/L


 


Potassium    3.3 L  (3.5-5.1)  mmol/L


 


BUN    22 H  (7-17)  mg/dL


 


POC Glucose (mg/dL)  166 H  120 H   (75-99)  mg/dL


 


Magnesium    1.5 L  (1.6-2.3)  mg/dL














  09/19/17 09/19/17 Range/Units





  06:03 11:38 


 


Sodium    (137-145)  mmol/L


 


Potassium    (3.5-5.1)  mmol/L


 


BUN    (7-17)  mg/dL


 


POC Glucose (mg/dL)  102 H  119 H  (75-99)  mg/dL


 


Magnesium    (1.6-2.3)  mg/dL














Assessment and Plan


Plan: 





Assessment





1 acute bronchial asthma exacerbation, improving and the patient has improved 

significantly over the past 24 hours and currently she is on Symbicort 

maintenance and completing a prednisone burst taper.





On 09/19/2017 the patient is improving.  The patient is currently on a 

prednisone burst taper.





2 CVA, history of





3 seizure disorder, history of currently inactive in stable





4 chronic hyponatremia with a sodium level of 129, probably drug-induced and 

the sodium level is also improved 132.





5 CHF or compensated for now, and the echocardiogram showed no evidence of any 

significant pulmonary hypertension and the patient has mild concentric left 

ventricular hypertrophy with an ejection fraction of 55-60%.  No significant 

valvular heart disease.





6 chest pain currently under investigation, likely atypical





7 hypertension





8 hyperlipidemia





9 coronary artery disease with previous history of cardiac catheterization and 

she has disease involving the RCA.  





Plan





Complete the course of prednisone burst taper.  Suggest a 12 day course with 10 

mg taper every 3 days.  Continue Symbicort as maintenance.  Provide the patient 

incentive spirometer.  Early mobility.  Cardiology follow-up regarding the 

chest pain.  Monitor sodium level.  Her current sodium level is already 

improved.  We'll follow.

## 2017-09-19 NOTE — P.DS
Providers


Date of admission: 


09/16/17 14:43





Expected date of discharge: 09/20/17


Attending physician: 


Antonio Tuttle





Consults: 





 





09/15/17 07:00


Consult Physician Routine 


   Consulting Provider: [facility]


   Consult Reason/Comments: chest pain, hypertension


   Do you want consulting provider notified?: Yes, Notify in am





09/15/17 11:53


Consult Physician Routine 


   Consulting Provider: Aleksandra Daniels


   Consult Reason/Comments: Sodium Issues


   Do you want consulting provider notified?: Yes





09/15/17 13:46


Consult Physician Routine 


   Consulting Provider: Stephany Rust


   Consult Reason/Comments: shortness of breath


   Do you want consulting provider notified?: Yes





09/16/17 14:41


Consult Physician Routine 


   Consulting Provider: Manuel Ramos


   Consult Reason/Comments: seizure


   Do you want consulting provider notified?: Yes











Primary care physician: 


Duglas HAMMOND [facility] Course: 








FINAL DIAGNOSES:


-SIADH with hypoosmolar hyponatremia, chronic


-Chronic obstructive asthma, doesn't appear to be in acute exacerbation was-GERD

-hyperlipidemia


-Essential hypertension


-Hiatal hernia neck-chronic cardiac.  This can lower back pain-depression 

otherwise specified


-Gait dysfunction uses a wheelchair at baseline


-Acute on chronic seizure disorder


-Acute on chronic congestive heart exacerbation from dust or dysfunction EF at 

40-60%, from hypertensive heart disease


-Moderate mitral regurgitation, non-rheumatic


-Ascending aortic aneurysm, measuring 4.2 cm, stable





HOSPTIAL COURSE:


67-year-old female presented with chest pressure like sensation, shortness of 

breath.  History of SIADH in a recent hospital admission for the same.  Also 

has a history of seizure activity and she experienced tonic-clonic seizures on 

September 16 and 17th.  Cardiology, nephrology, neurology consulted.  

Cardiology performed an echocardiogram, BNP level, added Lasix, fluid 

restriction, clonidine, and Norvasc.  Blood pressure was uncontrolled and 

hydralazine was added Lasix IV was switched to oral.  Nephrology consulted for 

hyponatremia with a component of SIADH due to Trileptal and Remeron that she is 

on.  Lasix continued, advised a low-salt diet with a 50 ounce fluid restriction 

daily, sodium tabs changed to 1 g twice daily with changes necessary may be at 

a later date.  Neurology consulted for breakthrough  seizures, head CT 

performed additional doses of Trileptal given, EEG ordered, no further seizures 

were noted or recorded.  Sodium level improved, blood pressure stabilized, no 

further seizures were noted, patient tolerating her diet eating between 50 and 

70% of her meals, requires assistance into her wheelchair and this is her 

baseline, last BM 09/16/2017.  Cardiology, nephrology, neurology cleared 

patient for discharge as such patient was discharged home with home care staff.








PHYSICAL EXAM: 





CARDIOVASCULAR: First and second sound noted mild edema





RESPIRATORY: Respiratory effort normal, bilateral breath sounds diminished.





MUSKULOSKELETAL: Uses a wheelchair at baseline





NEUROLOGIC: No focal deficits, neurologic exam grossly intact








Patient was seen and examined by nurse practitioner Skye Sanchez in all 

elements of the case discussed with attending Dr. Tuttle


DISPOSITION: Home to the care of her family with visiting nurse








Patient Condition at Discharge: Stable





Plan - Discharge Summary


New Discharge Prescriptions: 


New


   Atorvastatin [Lipitor] 10 mg PO DAILY #30 tab


   Budesonide-Formot 160-4.5 Mcg [Symbicort 160-4.5 Mcg Inhaler] 2 puff 

INHALATION RT-BID #1 puff


   hydrALAZINE HCL [Apresoline] 25 mg PO TID #90 tab


   Isosorbide Mononitrate ER [Imdur] 30 mg PO DAILY #30 tab


   Losartan [Cozaar] 100 mg PO DAILY #60 tab


   Nitroglycerin Sl Tabs [Nitrostat] 0.4 mg SUBLINGUAL Q5M PRN #20 tab


     PRN Reason: Chest Pain


   predniSONE See Taper PO DAILY #20 tab


   Sodium Chloride Tab 1 gm PO DAILY #30 tab





Continue


   Omeprazole [PriLOSEC] 20 mg PO BID


   OXcarbazepine [Trileptal] 600 mg PO BID


   Albuterol Nebulized [Ventolin Nebulized] 2.5 mg INHALATION RT-Q4H PRN


     PRN Reason: Shortness Of Breath


   ALPRAZolam [Xanax] 0.25 mg PO TID


   guaiFENesin [Mucinex] 600 mg PO Q12H


   SILVER sulfADIAZINE Cream [Silvadene 1% Cream] 1 applic TOPICAL BID


   Promethazine 6.25MG/5Ml [Phenergan Syrup] 6.25 mg PO AC-TID


   Gabapentin 800 mg PO TID


   HYDROmorphone HCL [Dilaudid] 4 mg PO Q4H


   Glycopyrrolate/Formoterol Fum [Bevespi Aerosphere Inhaler] 2 puff INHALATION 

RT-BID


   Albuterol Inhaler [Ventolin Hfa Inhaler] 1 - 2 puff INHALATION RT-Q6H PRN


     PRN Reason: Shortness Of Breath


   Mirtazapine 30 mg PO DAILY@2000


   ARIPiprazole [Abilify] 5 mg PO QAM


   Aspirin 81 mg PO DAILY #1 chewable


   Furosemide [Lasix] 20 mg PO DAILY #30 tab


   Melatonin 3 mg PO HS


   Carvedilol [Coreg] 25 mg PO BID-W/MEALS


   Multivitamins, Thera [Multivitamin (formulary)] 1 tab PO DAILY





Discontinued


   Losartan [Cozaar] 50 mg PO DAILY


   Simvastatin [Zocor] 20 mg PO DAILY


   Sodium Chloride Tab 1.5 gm PO BID #60 


Discharge Medication List





Albuterol Nebulized [Ventolin Nebulized] 2.5 mg INHALATION RT-Q4H PRN 07/08/15 [

History]


OXcarbazepine [Trileptal] 600 mg PO BID 07/08/15 [History]


Omeprazole [PriLOSEC] 20 mg PO BID 07/08/15 [History]


ALPRAZolam [Xanax] 0.25 mg PO TID 07/10/15 [History]


ARIPiprazole [Abilify] 5 mg PO QAM 09/07/17 [History]


Albuterol Inhaler [Ventolin Hfa Inhaler] 1 - 2 puff INHALATION RT-Q6H PRN 09/07/ 17 [History]


Gabapentin 800 mg PO TID 09/07/17 [History]


Glycopyrrolate/Formoterol Fum [Bevespi Aerosphere Inhaler] 2 puff INHALATION RT-

BID 09/07/17 [History]


HYDROmorphone HCL [Dilaudid] 4 mg PO Q4H 09/07/17 [History]


Mirtazapine 30 mg PO DAILY@2000 09/07/17 [History]


Promethazine 6.25MG/5Ml [Phenergan Syrup] 6.25 mg PO AC-TID 09/07/17 [History]


SILVER sulfADIAZINE Cream [Silvadene 1% Cream] 1 applic TOPICAL BID 09/07/17 [

History]


guaiFENesin [Mucinex] 600 mg PO Q12H 09/07/17 [History]


Aspirin 81 mg PO DAILY #1 chewable 09/11/17 [Rx]


Furosemide [Lasix] 20 mg PO DAILY #30 tab 09/11/17 [Rx]


Carvedilol [Coreg] 25 mg PO BID-W/MEALS 09/14/17 [History]


Melatonin 3 mg PO HS 09/14/17 [History]


Multivitamins, Thera [Multivitamin (formulary)] 1 tab PO DAILY 09/14/17 [History

]


Atorvastatin [Lipitor] 10 mg PO DAILY #30 tab 09/19/17 [Rx]


Budesonide-Formot 160-4.5 Mcg [Symbicort 160-4.5 Mcg Inhaler] 2 puff INHALATION 

RT-BID #1 puff 09/19/17 [Rx]


Isosorbide Mononitrate ER [Imdur] 30 mg PO DAILY #30 tab 09/19/17 [Rx]


Losartan [Cozaar] 100 mg PO DAILY #60 tab 09/19/17 [Rx]


Nitroglycerin Sl Tabs [Nitrostat] 0.4 mg SUBLINGUAL Q5M PRN #20 tab 09/19/17 [Rx

]


Sodium Chloride Tab 1 gm PO DAILY #30 tab 09/19/17 [Rx]


hydrALAZINE HCL [Apresoline] 25 mg PO TID #90 tab 09/19/17 [Rx]


predniSONE See Taper PO DAILY #20 tab 09/19/17 [Rx]








Follow up Appointment(s)/Referral(s): 


Duglas Davis MD [Primary Care Provider] - 1-2 days (Message left on 

answering machine. Office should call you)


Ishmael Lane MD [STAFF PHYSICIAN] - 2 Weeks (Spoke to .  

Office will call with appointment time.)


[facility] Visiting Nurse, [NON-STAFF] - 


Ambulatory/Diagnostic Orders: 


Basic Metabolic Panel [LAB.AMB] Location: Determined By Patient


Patient Instructions/Handouts:  Nonepileptic Seizures (DC)


Discharge Disposition: TRANSFER TO SNF/ECF

## 2017-09-19 NOTE — P.PN
Subjective


Principal diagnosis: 





Uncontrolled hypertension


This is a 67-year-old  female with history of prior ASD repair 

hypertension, recurrent hyponatremia, diastolic heart failure, cardiac catheter 

performed in July 2015 did not reveal any significant obstructive coronary 

artery disease.  Blood pressure this morning under much better control, 133/63 

with a heart rate in the 60s.  Sodium this morning is 129.  Patient does 

complain this morning of feeling somewhat short of breath.  Scheduled for EEG 

today.  Chest x-rays been performed which is yet pending.  Dose of Lasix 

increased by nephrology today.








09/19/2017


Patient seen and examined today, awake and alert.  Blood pressure is remaining 

under adequate control.  Patient is on beta blocker in the form of Coreg 25 mg 

one tablet by mouth twice a day, along with losartan 100 mg daily and 

hydralazine 25 3 times a day, we will add a small dose of nitrates as well.  

They is 132, potassium 3.3, magnesium level I.5.








Objective





- Vital Signs


Vital signs: 


 Vital Signs











Temp  96.8 F L  09/19/17 08:00


 


Pulse  64   09/19/17 08:53


 


Resp  18   09/19/17 08:00


 


BP  139/66   09/19/17 08:00


 


Pulse Ox  100   09/19/17 08:00








 Intake & Output











 09/18/17 09/19/17 09/19/17





 18:59 06:59 18:59


 


Intake Total 800  


 


Output Total 850 100 


 


Balance -50 -100 


 


Weight  88.5 kg 


 


Intake:   


 


  Oral 800  


 


Output:   


 


  Urine 850 100 


 


Other:   


 


  Voiding Method Bedpan Bedpan 





 Diaper  


 


  # Voids 1 2 














- Exam





PHYSICAL EXAMINATION: 





HEENT: Head is atraumatic, normocephalic.  Pupils equal, round.  Neck is 

supple.  There is no elevated jugular venous pressure.





HEART EXAMINATION: Heart S1, S2 normal.  No murmur or gallop heard.





CHEST EXAMINATION: Lungs are clear with diminished air entry to bilateral bases.





ABDOMEN:  Soft, nontender. Bowel sounds are heard. No organomegaly noted.


 


EXTREMITIES:[ 2+ peripheral pulses with no evidence of peripheral edema and no 

calf tenderness noted].





NEUROLOGIC [patient is awake, alert and oriented -3.]


 


.


 








- Labs


CBC & Chem 7: 


 09/14/17 22:41





 09/19/17 05:53


Labs: 


 Abnormal Lab Results - Last 24 Hours (Table)











  09/18/17 09/18/17 09/18/17 Range/Units





  05:27 10:40 12:02 


 


Sodium     (137-145)  mmol/L


 


Potassium     (3.5-5.1)  mmol/L


 


BUN     (7-17)  mg/dL


 


POC Glucose (mg/dL)    116 H  (75-99)  mg/dL


 


Osmolality  271 L    (280-301)  mosm/kg


 


Magnesium     (1.6-2.3)  mg/dL


 


Ur Random Sodium   116 H   (30-90)  mmol/L














  09/18/17 09/18/17 09/19/17 Range/Units





  16:24 21:21 05:53 


 


Sodium    132 L  (137-145)  mmol/L


 


Potassium    3.3 L  (3.5-5.1)  mmol/L


 


BUN    22 H  (7-17)  mg/dL


 


POC Glucose (mg/dL)  166 H  120 H   (75-99)  mg/dL


 


Osmolality     (280-301)  mosm/kg


 


Magnesium    1.5 L  (1.6-2.3)  mg/dL


 


Ur Random Sodium     (30-90)  mmol/L














  09/19/17 Range/Units





  06:03 


 


Sodium   (137-145)  mmol/L


 


Potassium   (3.5-5.1)  mmol/L


 


BUN   (7-17)  mg/dL


 


POC Glucose (mg/dL)  102 H  (75-99)  mg/dL


 


Osmolality   (280-301)  mosm/kg


 


Magnesium   (1.6-2.3)  mg/dL


 


Ur Random Sodium   (30-90)  mmol/L














Assessment and Plan


(1) Accelerated hypertension


Status: Acute   





(2) Seizure


Status: Acute   





(3) Thoracic aortic aneurysm


Status: Acute   





(4) Hyponatremia


Status: Acute   





(5) HTN (hypertension)


Status: Acute   





(6) Hyperlipemia


Status: Acute   


Plan: 


From cardiology's perspective, we'll discontinue when necessary hydralazine, 

add a small dose of Imdur to her medication regime.  Replace potassium and 

magnesium.





DNP note has been reviewed, I agree with a documented findings and plan of 

care.  Patient was seen and examined.

## 2017-09-20 NOTE — P.PN
Subjective





This patient is a 67-year-old right-handed white female who is seen in 

neurology consultation yesterday for evaluation of seizure disorder.  Patient 

was noted yesterday as having severe hyponatremia with a serum sodium level of 

129.  She had a witnessed seizure yesterday and was transferred to the 

selective care floor.  Her serum sodium today is 130.  She is being seen by 

nephrology were monitoring her hyponatremia.  She has a component of SIADH 

syndrome particularly due to the use of her Trileptal and Remeron.  Her serum 

sodium level today is 130.  It has been noted that the patient was admitted to 

[facility] in January of this year.  They 

attempted to wean her off her Trileptal unsuccessfully.  She was restarted on 

Trileptal his primary treatment for her seizure disorder.  We would recommend 

that the patient follow the directions of nephrology with the use of salt 

tablets.  Hopefully she can maintain her serum sodium level above 134.  She is 

scheduled for routine EEG tomorrow which will be reviewed.  Continue close 

monitoring of her blood pressure readings.  Her overall prognosis at this time 

remains guarded.  The patient has had some difficulty with her breathing and is 

on corticosteroids and bronchodilators.  She will need pulmonary function 

testing done in the outpatient clinic.  The patient neurologically has not had 

any seizures today.  She is to continue on her current dose of Trileptal.  We 

are still awaiting the Trileptal blood level to return from the laboratory.  

Her Trileptal blood level did come back in the therapeutic range.  She has had 

no further breakthrough seizures.  Patient has had no further seizure-like 

activity since her admission to hospital.  She is been tolerating her diet and 

eating about 50% of her meals.  Patient is being evaluated for possible 

inpatient rehab admission if she meets admission criteria.  We will continue 

close monitoring of her serum sodium level.  Overall prognosis at this time 

remains guarded.





Objective





- Vital Signs


Vital signs: 


 Vital Signs











Temp  97.2 F L  09/20/17 12:00


 


Pulse  68   09/20/17 12:13


 


Resp  18   09/20/17 12:00


 


BP  88/53   09/20/17 12:00


 


Pulse Ox  95   09/20/17 12:00








 Intake & Output











 09/19/17 09/20/17 09/20/17





 18:59 06:59 18:59


 


Intake Total 120  360


 


Output Total 700 300 


 


Balance -580 -300 360


 


Weight  87.5 kg 


 


Intake:   


 


  Oral 120  360


 


Output:   


 


  Urine 700 300 


 


Other:   


 


  Voiding Method Bedpan Bedpan Bedpan


 


  # Voids 1 1 2


 


  # Bowel Movements   2














- Exam





Physical examination:





PHYSICAL EXAMINATION: Patient is resting comfortably in bed. 


VITAL SIGNS: Blood pressure is [88/53]. Heart rate is [61]. Respiration is [18]

. Temperature is [97.2].


HEENT: Head is atraumatic, neck is supple, there were no carotid bruits.  97.1


CHEST: Lungs are clear to auscultation and percussion.  


CARDIAC: S1, S2 normal rate and rhythm. There is no murmur.


ABDOMEN: Soft and nontender. Bowel sounds are present.


EXTREMITIES:  There is no pedal edema.  Peripheral pulses are present.





Neurological examination:





Patient's neurological examination is unchanged from yesterday.





- Labs


CBC & Chem 7: 


 09/14/17 22:41





 09/20/17 05:50


Labs: 


 Abnormal Lab Results - Last 24 Hours (Table)











  09/19/17 09/20/17 09/20/17 Range/Units





  20:42 05:50 11:33 


 


Sodium   133 L   (137-145)  mmol/L


 


Creatinine   0.48 L   (0.52-1.04)  mg/dL


 


POC Glucose (mg/dL)  110 H   117 H  (75-99)  mg/dL














Assessment and Plan


(1) Seizure disorder


Status: Acute   Code(s): G40.909 - EPILEPSY, UNSP, NOT INTRACTABLE, WITHOUT 

STATUS EPILEPTICUS   





(2) Hyponatremia


Status: Acute   Code(s): E87.1 - HYPO-OSMOLALITY AND HYPONATREMIA   





(3) Anemia


Status: Acute   Code(s): D64.9 - ANEMIA, UNSPECIFIED   





(4) Cardiomyopathy


Status: Acute   Code(s): I42.9 - CARDIOMYOPATHY, UNSPECIFIED   


Plan: 





This patient is a 67-year-old female who was admitted to hospital for symptoms 

of seizure disorder and recent hyponatremia.  Patient was also complaining of 

atypical chest pain.  She was seen in the ER and underwent a computed 

tomography scan of the brain which failed to reveal any evidence of acute 

stroke or hemorrhage.  She was subsequent admitted to Hospital.  Her serum 

sodium today is 129.  She has been taking Trileptal 600 mg twice a day as her 

primary anticonvulsant medication.  She is to be seen by nephrology for further 

assessment of her renal function.  We will await their input in terms of long-

term management of her seizures with Trileptal.  Her Trileptal blood level was 

drawn this morning and this pending at this time.  As noted the patient has 

been attempted to wean off of Trileptal in the past unsuccessfully.  Her recent 

Trileptal blood level did come back in the therapeutic range.  She is very 

reluctant to discontinue use of Trileptal for seizure management.  We will 

continue to await further recommendations from nephrology regarding further 

monitoring and treatment of her hyponatremia.  Patient's repeat serum sodium 

today is 133.  She has been able to eat her meals well with no signs of nausea 

vomiting.  Her dehydration has been treated completely.  She is being 

considered for discharge to home later today versus subacute rehab.  We will 

continue close neurological follow-up of this patient during this admission.  

Her overall prognosis at this time remains very guarded.

## 2017-09-20 NOTE — P.PN
Subjective





This patient is a 67-year-old right-handed white female who is seen in 

neurology consultation yesterday for evaluation of seizure disorder.  Patient 

was noted yesterday as having severe hyponatremia with a serum sodium level of 

129.  She had a witnessed seizure yesterday and was transferred to the 

selective care floor.  Her serum sodium today is 130.  She is being seen by 

nephrology were monitoring her hyponatremia.  She has a component of SIADH 

syndrome particularly due to the use of her Trileptal and Remeron.  Her serum 

sodium level today is 130.  It has been noted that the patient was admitted to 

[facility] in January of this year.  They 

attempted to wean her off her Trileptal unsuccessfully.  She was restarted on 

Trileptal his primary treatment for her seizure disorder.  We would recommend 

that the patient follow the directions of nephrology with the use of salt 

tablets.  Hopefully she can maintain her serum sodium level above 134.  She is 

scheduled for routine EEG tomorrow which will be reviewed.  Continue close 

monitoring of her blood pressure readings.  Her overall prognosis at this time 

remains guarded.  The patient has had some difficulty with her breathing and is 

on corticosteroids and bronchodilators.  She will need pulmonary function 

testing done in the outpatient clinic.  The patient neurologically has not had 

any seizures today.  She is to continue on her current dose of Trileptal.  We 

are still awaiting the Trileptal blood level to return from the laboratory.  

Her Trileptal blood level did come back in the therapeutic range.  She has had 

no further breakthrough seizures.  We will continue close monitoring of her 

serum sodium level.  Overall prognosis at this time remains guarded.





Objective





- Vital Signs


Vital signs: 


 Vital Signs











Temp  98.3 F   09/19/17 16:00


 


Pulse  70   09/19/17 20:43


 


Resp  18   09/19/17 16:00


 


BP  148/70   09/19/17 16:00


 


Pulse Ox  99   09/19/17 16:00








 Intake & Output











 09/19/17 09/19/17 09/20/17





 06:59 18:59 06:59


 


Intake Total  120 


 


Output Total 100 700 


 


Balance -100 -580 


 


Weight 88.5 kg  


 


Intake:   


 


  Oral  120 


 


Output:   


 


  Urine 100 700 


 


Other:   


 


  Voiding Method Bedpan Bedpan 


 


  # Voids 2 1 














- Labs


CBC & Chem 7: 


 09/14/17 22:41





 09/20/17 05:50


Labs: 


 Abnormal Lab Results - Last 24 Hours (Table)











  09/19/17 09/19/17 09/19/17 Range/Units





  05:53 06:03 11:38 


 


Sodium  132 L    (137-145)  mmol/L


 


Potassium  3.3 L    (3.5-5.1)  mmol/L


 


BUN  22 H    (7-17)  mg/dL


 


POC Glucose (mg/dL)   102 H  119 H  (75-99)  mg/dL


 


Magnesium  1.5 L    (1.6-2.3)  mg/dL














  09/19/17 09/19/17 Range/Units





  16:29 20:42 


 


Sodium    (137-145)  mmol/L


 


Potassium    (3.5-5.1)  mmol/L


 


BUN    (7-17)  mg/dL


 


POC Glucose (mg/dL)  135 H  110 H  (75-99)  mg/dL


 


Magnesium    (1.6-2.3)  mg/dL














Assessment and Plan


(1) Seizure disorder


Status: Acute   Code(s): G40.909 - EPILEPSY, UNSP, NOT INTRACTABLE, WITHOUT 

STATUS EPILEPTICUS   





(2) Hyponatremia


Status: Acute   Code(s): E87.1 - HYPO-OSMOLALITY AND HYPONATREMIA   





(3) Anemia


Status: Acute   Code(s): D64.9 - ANEMIA, UNSPECIFIED   





(4) Cardiomyopathy


Status: Acute   Code(s): I42.9 - CARDIOMYOPATHY, UNSPECIFIED

## 2017-09-20 NOTE — P.DS
<Antonio Tuttle - Last Filed: 09/20/17 19:11>





Providers


Date of admission: 


09/16/17 14:43





Attending physician: 


Antonio Tuttle





Consults: 





 





09/15/17 07:00


Consult Physician Routine 


   Consulting Provider: [facility]


   Consult Reason/Comments: chest pain, hypertension


   Do you want consulting provider notified?: Yes, Notify in am





09/15/17 11:53


Consult Physician Routine 


   Consulting Provider: Aleksandra Daniels


   Consult Reason/Comments: Sodium Issues


   Do you want consulting provider notified?: Yes





09/15/17 13:46


Consult Physician Routine 


   Consulting Provider: Stephany Rust


   Consult Reason/Comments: shortness of breath


   Do you want consulting provider notified?: Yes





09/16/17 14:41


Consult Physician Routine 


   Consulting Provider: Manuel Ramos


   Consult Reason/Comments: seizure


   Do you want consulting provider notified?: Yes











Primary care physician: 


[facility] Course: 


Attending note.


Date of service-09/20/2017


This patient was seen and examined by me .  Discussed the patient with my nurse 

practitioner Ms. Sanchez.


Doing well.  Stable.  Patient will be going to the ECF.





On examination:


Lungs-clear entry


Investigations:





Assessment and plan:


Conditions reviewed.  Meds are reviewed.  Discharged to ECF.  Care discussed 

with the patient.





Plan - Discharge Summary


New Discharge Prescriptions: 


New


   Atorvastatin [Lipitor] 10 mg PO DAILY #30 tab


   Budesonide-Formot 160-4.5 Mcg [Symbicort 160-4.5 Mcg Inhaler] 2 puff 

INHALATION RT-BID #1 puff


   hydrALAZINE HCL [Apresoline] 25 mg PO TID #90 tab


   Isosorbide Mononitrate ER [Imdur] 30 mg PO DAILY #30 tab


   Losartan [Cozaar] 100 mg PO DAILY #60 tab


   Nitroglycerin Sl Tabs [Nitrostat] 0.4 mg SUBLINGUAL Q5M PRN #20 tab


     PRN Reason: Chest Pain


   predniSONE See Taper PO DAILY #20 tab


   Sodium Chloride Tab 1 gm PO DAILY #30 tab





Continue


   Omeprazole [PriLOSEC] 20 mg PO BID


   OXcarbazepine [Trileptal] 600 mg PO BID


   Albuterol Nebulized [Ventolin Nebulized] 2.5 mg INHALATION RT-Q4H PRN


     PRN Reason: Shortness Of Breath


   ALPRAZolam [Xanax] 0.25 mg PO TID


   guaiFENesin [Mucinex] 600 mg PO Q12H


   SILVER sulfADIAZINE Cream [Silvadene 1% Cream] 1 applic TOPICAL BID


   Promethazine 6.25MG/5Ml [Phenergan Syrup] 6.25 mg PO AC-TID


   Gabapentin 800 mg PO TID


   HYDROmorphone HCL [Dilaudid] 4 mg PO Q4H


   Glycopyrrolate/Formoterol Fum [Bevespi Aerosphere Inhaler] 2 puff INHALATION 

RT-BID


   Albuterol Inhaler [Ventolin Hfa Inhaler] 1 - 2 puff INHALATION RT-Q6H PRN


     PRN Reason: Shortness Of Breath


   Mirtazapine 30 mg PO DAILY@2000


   ARIPiprazole [Abilify] 5 mg PO QAM


   Aspirin 81 mg PO DAILY #1 chewable


   Furosemide [Lasix] 20 mg PO DAILY #30 tab


   Melatonin 3 mg PO HS


   Carvedilol [Coreg] 25 mg PO BID-W/MEALS


   Multivitamins, Thera [Multivitamin (formulary)] 1 tab PO DAILY





Discontinued


   Losartan [Cozaar] 50 mg PO DAILY


   Simvastatin [Zocor] 20 mg PO DAILY


   Sodium Chloride Tab 1.5 gm PO BID #60 


Discharge Medication List





Albuterol Nebulized [Ventolin Nebulized] 2.5 mg INHALATION RT-Q4H PRN 07/08/15 [

History]


OXcarbazepine [Trileptal] 600 mg PO BID 07/08/15 [History]


Omeprazole [PriLOSEC] 20 mg PO BID 07/08/15 [History]


ALPRAZolam [Xanax] 0.25 mg PO TID 07/10/15 [History]


ARIPiprazole [Abilify] 5 mg PO QAM 09/07/17 [History]


Albuterol Inhaler [Ventolin Hfa Inhaler] 1 - 2 puff INHALATION RT-Q6H PRN 09/07/ 17 [History]


Gabapentin 800 mg PO TID 09/07/17 [History]


Glycopyrrolate/Formoterol Fum [Bevespi Aerosphere Inhaler] 2 puff INHALATION RT-

BID 09/07/17 [History]


HYDROmorphone HCL [Dilaudid] 4 mg PO Q4H 09/07/17 [History]


Mirtazapine 30 mg PO DAILY@2000 09/07/17 [History]


Promethazine 6.25MG/5Ml [Phenergan Syrup] 6.25 mg PO AC-TID 09/07/17 [History]


SILVER sulfADIAZINE Cream [Silvadene 1% Cream] 1 applic TOPICAL BID 09/07/17 [

History]


guaiFENesin [Mucinex] 600 mg PO Q12H 09/07/17 [History]


Aspirin 81 mg PO DAILY #1 chewable 09/11/17 [Rx]


Furosemide [Lasix] 20 mg PO DAILY #30 tab 09/11/17 [Rx]


Carvedilol [Coreg] 25 mg PO BID-W/MEALS 09/14/17 [History]


Melatonin 3 mg PO HS 09/14/17 [History]


Multivitamins, Thera [Multivitamin (formulary)] 1 tab PO DAILY 09/14/17 [History

]


Atorvastatin [Lipitor] 10 mg PO DAILY #30 tab 09/19/17 [Rx]


Budesonide-Formot 160-4.5 Mcg [Symbicort 160-4.5 Mcg Inhaler] 2 puff INHALATION 

RT-BID #1 puff 09/19/17 [Rx]


Isosorbide Mononitrate ER [Imdur] 30 mg PO DAILY #30 tab 09/19/17 [Rx]


Losartan [Cozaar] 100 mg PO DAILY #60 tab 09/19/17 [Rx]


Nitroglycerin Sl Tabs [Nitrostat] 0.4 mg SUBLINGUAL Q5M PRN #20 tab 09/19/17 [Rx

]


Sodium Chloride Tab 1 gm PO DAILY #30 tab 09/19/17 [Rx]


hydrALAZINE HCL [Apresoline] 25 mg PO TID #90 tab 09/19/17 [Rx]


predniSONE See Taper PO DAILY #20 tab 09/19/17 [Rx]








Follow up Appointment(s)/Referral(s): 


Isauro Viveros, [NON-STAFF] - As Needed


Duglas Davis MD [Primary Care Provider] - 1-2 days (Message left on 

answering machine. Office should call you)


Ishmael Lane MD [STAFF PHYSICIAN] - 2 Weeks (Spoke to .  

Office will call with appointment time.)


[facility] Visiting Nurse, [NON-STAFF] - 


Ambulatory/Diagnostic Orders: 


Basic Metabolic Panel [LAB.AMB] Location: Determined By Patient


Patient Instructions/Handouts:  Nonepileptic Seizures (DC)


Discharge Disposition: TRANSFER TO SNF/ECF





<Skye Sanchez - Last Filed: 09/20/17 19:26>





Providers


Expected date of discharge: 09/20/17


Hospital Course: 


FINAL DIAGNOSES:


-SIADH with hypoosmolar hyponatremia, chronic


-Chronic obstructive asthma, doesn't appear to be in acute exacerbation 


-GERD-


-hyperlipidemia


-Essential hypertension


-Hiatal hernia 


-neck chronic   This can lower back pain


-depression not otherwise specified


-Gait dysfunction uses a wheelchair at baseline


-Acute on chronic seizure disorder


-Acute on chronic congestive heart exacerbation from dust or dysfunction EF at 

40-60%, from hypertensive heart disease


-Moderate mitral regurgitation, non-rheumatic


-Ascending aortic aneurysm, measuring 4.2 cm, stable





HOSPTIAL COURSE:


67-year-old female presented with chest pressure like sensation, shortness of 

breath.  History of SIADH in a recent hospital admission for the same.  Also 

has a history of seizure activity and she experienced tonic-clonic seizures on 

September 16 and 17th.  Cardiology, nephrology, neurology consulted.  

Cardiology performed an echocardiogram, BNP level, added Lasix, fluid 

restriction, clonidine, and Norvasc.  Blood pressure was uncontrolled and 

hydralazine was added Lasix IV was switched to oral.  Nephrology consulted for 

hyponatremia with a component of SIADH due to Trileptal and Remeron that she is 

on.  Lasix continued, advised a low-salt diet with a 50 ounce fluid restriction 

daily, sodium tabs changed to 1 g twice daily with changes necessary may be at 

a later date.  Neurology consulted for breakthrough  seizures, head CT 

performed additional doses of Trileptal given, EEG ordered, no further seizures 

were noted or recorded.  Sodium level improved, blood pressure stabilized, no 

further seizures were noted, patient tolerating her diet eating between 50 and 

70% of her meals, requires assistance into her wheelchair and this is her 

baseline, last BM 09/16/2017.  Cardiology, nephrology, neurology cleared 

patient for discharge as such patient was discharged home with home care staff.








PHYSICAL EXAM: 





CARDIOVASCULAR: First and second sound noted mild edema





RESPIRATORY: Respiratory effort normal, bilateral breath sounds diminished.





MUSKULOSKELETAL: Uses a wheelchair at baseline





NEUROLOGIC: No focal deficits, neurologic exam grossly intact








Patient was seen and examined by nurse practitioner Skye Sanchez in all 

elements of the case discussed with attending Dr. Tuttle

## 2017-09-20 NOTE — P.PN
<Antonio Tuttle - Last Filed: 09/20/17 16:57>





Progress Note - Text


Attending note.


Date of service-09/19/2017


This patient was seen and examined by me .  Discussed the patient with my nurse 

practitioner Ms. Sanchez.


Feeling better.  Starting a diet.  Resting in bed.





On examination:


Lungs-clear entry.  Cardiovascular first seconds are normal


Investigations:


Potassium 3.3, BUN 22, creatinine 0.60


Assessment and plan:


Multiple issues.  Stable.  Await further input from cardiology.  Looking at 

discharge planning.  Care was discussed with the patient.





<Skye Sanchez - Last Filed: 09/20/17 18:44>





Progress Note - Text


DATE OF SERVICE: 


09/19/2017





PRESENTING COMPLAINT:


Chest pain, shortness of breath





HISTORY OF PRESENT ILLNESS:


67-year-old female presented with chest pressure like sensation, and shortness 

of breath.  Has a history of SIADH with a recent hospital admission for the 

same.  On September 16 and 17th patient had tonic-clonic seizure for which 

neurology was consulted CT of the head was ordered.  Patient does also have a 

history of seizure activity.





INTERVAL HISTORY:


09/19/2017:


Patient lying in bed appears tired, seizure cautions in place, no further 

seizure activity reported.  Ambulatory with some assistance to and from the 

bathroom tolerating her diet eating about 50% of each meal.  Patient 

tentatively going home today.  Tolerating her diet eating about 40-50% of each 

meal, last BM prior to admission.


 


09/18/2017:


Today patient lying in bed, appears tired, seizure precautions in place, 

scheduled for an EEG today.  No further seizure activity reported.  Patient is 

ambulatory to with assistance to the bathroom, tolerating her diet and eating 

about 50% of each meal.  Blood pressure remains labile running systolic 102-194

, diastolic 52-96.  Neurology, nephrology and cardiology continue to follow.








REVIEW OF SYSTEMS:


Done for constitutional ,cardiovascular, GI, pulmonary with relevant findings 

as above.





CURRENT MEDICATIONS


Xanax, aspirin, Lipitor, Coreg 25 mg by mouth twice a day, Lasix 20 mg by mouth 

twice a day, Neurontin 800 mg by mouth 3 times a day, hydralazine 25 mg by 

mouth 3 times a day, Cozaar 100 mg by mouth daily, Remeron 30 mg by mouth daily 

Trileptal 600 mg by mouth twice a day, prednisone 40 mg by mouth daily








PHYSICAL EXAM


VITAL SIGNS: 


Temperature 97.8, pulse 63, respiratory rate 18, blood pressure 128/73, oxygen 

saturation 96% on 2 L





GENERAL APPEARANCE: Lying in bed, tired appearing. 


EYES: Pupils equal. Conjunctiva normal. 


NECK: JVD not raised. Mass not palpable. 


RESPIRATORY: Respiratory effort normal. Lungs clear to auscultation. 


CARDIOVASCULAR: First and second sounds normal. No edema. 


ABDOMEN: Soft. Liver and spleen not palpable. No tenderness. No mass palpable. 


PSYCHIATRY: Alert and oriented x3. Mood and affect normal. 


NEUROLOGICAL: Cranial nerves grossly intact. No facial asymmetry.  Power right 

side 3/5, power left side 3/5,





INVESTIGATIONS:


Sodium 132, potassium 3.3, magnesium 1.5, Accu-Cheks noted.





ASSESSMENT:


-SIADH with hypoosmolar hyponatremia, chronic


-Chronic obstructive asthma, doesn't appear to be an acute exacerbation 


-GERD


-hyperlipidemia


-Essential hypertension


-Hiatal hernia 


-chronic neck and back pain


-depression not otherwise specified


-Gait dysfunction uses a wheelchair at baseline


-Acute on chronic seizure disorder


-Acute on chronic congestive heart exacerbation from dust or dysfunction EF at 

40-60%, from hypertensive heart disease


-Moderate mitral regurgitation, non-rheumatic


-Ascending aortic aneurysm, measuring 4.2 cm, stable





PLAN:


Waiting for Trileptal levels to return,  no further seizure activity, continue 

to adjust antihypertensives, her blood pressure continues to be labile.  

Midafternoon patient attempted to get up with some assistance and was unable to 

do it due to dizziness and lightheadedness.  Patient laid back down and a 

second attempt was made about an hour and a half later, patient continued to be 

dizzy and lightheaded, stated she felt a seizure coming on.  She did not have 

any seizure activity. Discharge held until tomorrow, we will see how she does 

overnight, will also consult physical therapy to reevaluate, will consider 

rehab placement if she meets criteria.  Plan of care was discussed with the 

patient at bedside and she is in agreement we'll continue to monitor closely.





NP statement: Patient was seen and examined by nurse practitioner Skye Sanchez and all elements of the case discussed with attending  Dr. Tuttle

## 2018-07-09 ENCOUNTER — HOSPITAL ENCOUNTER (OUTPATIENT)
Dept: HOSPITAL 47 - RADBDWWP | Age: 68
Discharge: HOME | End: 2018-07-09
Attending: INTERNAL MEDICINE
Payer: MEDICARE

## 2018-07-09 DIAGNOSIS — M81.0: Primary | ICD-10-CM

## 2018-07-09 PROCEDURE — 77081 DXA BONE DENSITY APPENDICULR: CPT

## 2018-07-09 NOTE — BD
EXAMINATION TYPE: Axial Bone Density

 

DATE OF EXAM: 7/9/2018

 

COMPARISON: 2002

 

CLINICAL HISTORY: post menopausal. Sodium deficiency.

Height:  5'5

Weight:  170

 

FRAX RISK QUESTIONS:

History of Fracture in Adulthood: y

Secondary Osteoporosis:

    3.  Menopause before 45: y

  Rheumatoid Arthritis: y

Current Tobacco Use:

 

RISK FACTORS 

HISTORY OF: 

Spine Fracture: y low back

When: 2008

Active: n

family history osteoporosis : Y

Diet low in dairy products/other sources of calcium:  y

Postmenopausal woman: y

Poor Health: y

 

MEDICATIONS: 

Additional Medications: blood pressure, cholesterol pain ,

 

 

Additional History: pt wheelchair bound at all times, sodium deficiency

 

 

EXAM MEASUREMENTS: 

Bone mineral density about the L Wrist (g/cm2): 0.316

T Score values are as follows: 

-----Dist. R+U: -1.2

-----Prox. R+U: -7.4

-----Radius total: -5.9

 

 

IMPRESSION:

Osteoporosis (T Score less than -2.5).

 

There is increased fracture risk and therapy is usually indicated based on age.

 

Re-Screen 1-2 years.

 

 

 

 

 

NOTE:  T-SCORE=SD OF THE YOUNG ADULT MEAN.

## 2020-01-02 ENCOUNTER — HOSPITAL ENCOUNTER (INPATIENT)
Dept: HOSPITAL 47 - 3SCARD | Age: 70
LOS: 5 days | Discharge: NURSING, REHAB OR LONG-TERM CARE | DRG: 378 | End: 2020-01-07
Attending: INTERNAL MEDICINE | Admitting: INTERNAL MEDICINE
Payer: MEDICARE

## 2020-01-02 DIAGNOSIS — Z82.49: ICD-10-CM

## 2020-01-02 DIAGNOSIS — I50.32: ICD-10-CM

## 2020-01-02 DIAGNOSIS — D62: ICD-10-CM

## 2020-01-02 DIAGNOSIS — Z86.73: ICD-10-CM

## 2020-01-02 DIAGNOSIS — K29.51: Primary | ICD-10-CM

## 2020-01-02 DIAGNOSIS — K29.80: ICD-10-CM

## 2020-01-02 DIAGNOSIS — J45.909: ICD-10-CM

## 2020-01-02 DIAGNOSIS — K44.9: ICD-10-CM

## 2020-01-02 DIAGNOSIS — E87.1: ICD-10-CM

## 2020-01-02 DIAGNOSIS — Z79.899: ICD-10-CM

## 2020-01-02 DIAGNOSIS — Z79.891: ICD-10-CM

## 2020-01-02 DIAGNOSIS — G89.4: ICD-10-CM

## 2020-01-02 DIAGNOSIS — F32.9: ICD-10-CM

## 2020-01-02 DIAGNOSIS — I24.8: ICD-10-CM

## 2020-01-02 DIAGNOSIS — K22.8: ICD-10-CM

## 2020-01-02 DIAGNOSIS — K21.0: ICD-10-CM

## 2020-01-02 DIAGNOSIS — G40.909: ICD-10-CM

## 2020-01-02 DIAGNOSIS — E78.5: ICD-10-CM

## 2020-01-02 DIAGNOSIS — M19.90: ICD-10-CM

## 2020-01-02 DIAGNOSIS — T50.1X5A: ICD-10-CM

## 2020-01-02 DIAGNOSIS — F03.90: ICD-10-CM

## 2020-01-02 DIAGNOSIS — T44.7X5A: ICD-10-CM

## 2020-01-02 DIAGNOSIS — Z88.5: ICD-10-CM

## 2020-01-02 DIAGNOSIS — R53.1: ICD-10-CM

## 2020-01-02 DIAGNOSIS — T40.605A: ICD-10-CM

## 2020-01-02 DIAGNOSIS — D50.9: ICD-10-CM

## 2020-01-02 DIAGNOSIS — I11.0: ICD-10-CM

## 2020-01-02 DIAGNOSIS — F11.11: ICD-10-CM

## 2020-01-02 DIAGNOSIS — R00.1: ICD-10-CM

## 2020-01-02 DIAGNOSIS — Z90.710: ICD-10-CM

## 2020-01-02 PROCEDURE — 94640 AIRWAY INHALATION TREATMENT: CPT

## 2020-01-02 PROCEDURE — 83540 ASSAY OF IRON: CPT

## 2020-01-02 PROCEDURE — 85027 COMPLETE CBC AUTOMATED: CPT

## 2020-01-02 PROCEDURE — 94760 N-INVAS EAR/PLS OXIMETRY 1: CPT

## 2020-01-02 PROCEDURE — 93306 TTE W/DOPPLER COMPLETE: CPT

## 2020-01-02 PROCEDURE — 88312 SPECIAL STAINS GROUP 1: CPT

## 2020-01-02 PROCEDURE — 88342 IMHCHEM/IMCYTCHM 1ST ANTB: CPT

## 2020-01-02 PROCEDURE — 85045 AUTOMATED RETICULOCYTE COUNT: CPT

## 2020-01-02 PROCEDURE — 85025 COMPLETE CBC W/AUTO DIFF WBC: CPT

## 2020-01-02 PROCEDURE — 82728 ASSAY OF FERRITIN: CPT

## 2020-01-02 PROCEDURE — 80048 BASIC METABOLIC PNL TOTAL CA: CPT

## 2020-01-02 PROCEDURE — 84484 ASSAY OF TROPONIN QUANT: CPT

## 2020-01-02 PROCEDURE — 83550 IRON BINDING TEST: CPT

## 2020-01-02 PROCEDURE — 88305 TISSUE EXAM BY PATHOLOGIST: CPT

## 2020-01-02 PROCEDURE — 43239 EGD BIOPSY SINGLE/MULTIPLE: CPT

## 2020-01-02 PROCEDURE — 82746 ASSAY OF FOLIC ACID SERUM: CPT

## 2020-01-03 LAB
ANION GAP SERPL CALC-SCNC: 3 MMOL/L
BASOPHILS # BLD AUTO: 0 K/UL (ref 0–0.2)
BASOPHILS NFR BLD AUTO: 1 %
BUN SERPL-SCNC: 28 MG/DL (ref 7–17)
CALCIUM SPEC-MCNC: 7.7 MG/DL (ref 8.4–10.2)
CHLORIDE SERPL-SCNC: 102 MMOL/L (ref 98–107)
CO2 SERPL-SCNC: 34 MMOL/L (ref 22–30)
EOSINOPHIL # BLD AUTO: 0.4 K/UL (ref 0–0.7)
EOSINOPHIL NFR BLD AUTO: 10 %
ERYTHROCYTE [DISTWIDTH] IN BLOOD BY AUTOMATED COUNT: 3.06 M/UL (ref 3.8–5.4)
ERYTHROCYTE [DISTWIDTH] IN BLOOD: 15.3 % (ref 11.5–15.5)
FERRITIN SERPL-MCNC: 41.3 NG/ML (ref 10–291)
GLUCOSE SERPL-MCNC: 78 MG/DL (ref 74–99)
HCT VFR BLD AUTO: 28.1 % (ref 34–46)
HGB BLD-MCNC: 8.9 GM/DL (ref 11.4–16)
IRON SERPL-MCNC: 27 UG/DL (ref 50–170)
LYMPHOCYTES # SPEC AUTO: 1.2 K/UL (ref 1–4.8)
LYMPHOCYTES NFR SPEC AUTO: 27 %
MCH RBC QN AUTO: 29.1 PG (ref 25–35)
MCHC RBC AUTO-ENTMCNC: 31.7 G/DL (ref 31–37)
MCV RBC AUTO: 91.8 FL (ref 80–100)
MONOCYTES # BLD AUTO: 0.2 K/UL (ref 0–1)
MONOCYTES NFR BLD AUTO: 5 %
NEUTROPHILS # BLD AUTO: 2.4 K/UL (ref 1.3–7.7)
NEUTROPHILS NFR BLD AUTO: 56 %
PLATELET # BLD AUTO: 171 K/UL (ref 150–450)
POTASSIUM SERPL-SCNC: 4.3 MMOL/L (ref 3.5–5.1)
SODIUM SERPL-SCNC: 139 MMOL/L (ref 137–145)
TIBC SERPL-MCNC: 229 UG/DL (ref 228–460)
WBC # BLD AUTO: 4.4 K/UL (ref 3.8–10.6)

## 2020-01-03 PROCEDURE — 0DB98ZX EXCISION OF DUODENUM, VIA NATURAL OR ARTIFICIAL OPENING ENDOSCOPIC, DIAGNOSTIC: ICD-10-PCS

## 2020-01-03 PROCEDURE — 0DB78ZX EXCISION OF STOMACH, PYLORUS, VIA NATURAL OR ARTIFICIAL OPENING ENDOSCOPIC, DIAGNOSTIC: ICD-10-PCS

## 2020-01-03 PROCEDURE — 0DB38ZX EXCISION OF LOWER ESOPHAGUS, VIA NATURAL OR ARTIFICIAL OPENING ENDOSCOPIC, DIAGNOSTIC: ICD-10-PCS

## 2020-01-03 RX ADMIN — SERTRALINE HYDROCHLORIDE SCH MG: 50 TABLET, FILM COATED ORAL at 08:24

## 2020-01-03 RX ADMIN — SUCRALFATE SCH GM: 1 TABLET ORAL at 20:33

## 2020-01-03 RX ADMIN — IPRATROPIUM BROMIDE AND ALBUTEROL SULFATE PRN ML: .5; 3 SOLUTION RESPIRATORY (INHALATION) at 11:57

## 2020-01-03 RX ADMIN — PANTOPRAZOLE SODIUM SCH MG: 40 TABLET, DELAYED RELEASE ORAL at 16:48

## 2020-01-03 RX ADMIN — GUAIFENESIN PRN MG: 200 SOLUTION ORAL at 09:59

## 2020-01-03 RX ADMIN — IPRATROPIUM BROMIDE AND ALBUTEROL SULFATE PRN ML: .5; 3 SOLUTION RESPIRATORY (INHALATION) at 19:40

## 2020-01-03 RX ADMIN — LISINOPRIL SCH MG: 10 TABLET ORAL at 08:24

## 2020-01-03 RX ADMIN — SUCRALFATE SCH GM: 1 TABLET ORAL at 16:48

## 2020-01-03 RX ADMIN — ATORVASTATIN CALCIUM SCH MG: 10 TABLET, FILM COATED ORAL at 20:33

## 2020-01-03 RX ADMIN — GUAIFENESIN PRN MG: 200 SOLUTION ORAL at 16:48

## 2020-01-03 RX ADMIN — FUROSEMIDE SCH MG: 40 TABLET ORAL at 08:24

## 2020-01-03 NOTE — P.HPIM
History of Present Illness


H&P Date: 01/03/20


Chief Complaint: Generalized weakness





Patient is a 69-year-old female with a known history of chronic CHF, 

cardiomegaly on follow-up with cardiology as an outpatient, hypertension, 

hyperlipidemia, osteoarthritis and depression who is currently staying get 

extended care facility was sent to [facility] initially due to patient

being very lethargic and drowsy and more sleepy than usual.  Patient was found 

to have a hemoglobin level IX.1 and dropped down to 8.6.  Patient had chest x-

ray showed no acute cardiopulmonary process.  Mild cardiomegaly.  BNP is not 

elevated at 82 and troponin level is 0.0-8.  Patient was found have bradycardic 

with heart rate in 40s when she was presented to [facility].


FOBT was positive.  Patient was transferred to [facility] due 

to possible GI bleed and further evaluation by gastroenterology. 


Patient had EGD today morning.  Hemoglobin was 8.9. patient is also found to 

have iron deficiency as per iron profile.


1.  LA grade D distal esophagitis, biopsied.


2.  Mild gastritis antrum and body, biopsied.


3.  Large hiatal hernia.


Currently patient denied any complaints of chest pain or worsening shortness of 

breath.  No nausea vomiting or abdominal pain.  Patient is more awake and 

oriented now.


Patient does take Dilaudid by mouth, fentanyl patch for pain management.  

Patient is also on Cymbalta at home.


Does have mild cognitive impairment.


Coreg 25 mg twice daily.  Which is held due to sinus bradycardia.





Review of Systems





Constitutional: Patient denies any fever or chills .  Generalized weakness and 

lethargic..  


Abdomen: Patient denied nausea vomiting and diarrhea and abdominal pain.


Cardiovascular: Patient denies any chest pain or short of breath no 

palpitations.


Respiratory: patient denied any cough is from production.  No shortness of 

breath


Neurologic: Patient denied any numbness or tingling headache.


Musculoskeletal: Patient denies any complaints of joint swelling or deformity.


Skin: Negative


Psychiatric: Negative


Endocrine: No heat or cold intolerance.  No recent weight gain.


Genitourinary: No dysuria or hematuria.


All other 14 point ROS negative except the above





Past Medical History


Past Medical History: Asthma, Chest Pain / Angina, Heart Failure, CVA/TIA, 

GERD/Reflux, Hyperlipidemia, Hypertension, Osteoarthritis (OA), Seizure Disorder


Additional Past Medical History / Comment(s): anemia, SOB, dizziness, hx. hiatal

hernia, herniated discs back, TIA years ago, last seizure 8 yrs. ago


History of Any Multi-Drug Resistant Organisms: None Reported


Past Surgical History: Heart Catheterization, Hysterectomy


Additional Past Surgical History / Comment(s): fatty tumor removed back, hole in

heart repaired w/"patch"


Past Anesthesia/Blood Transfusion Reactions: Postoperative Nausea & Vomiting 

(PONV)


Past Psychological History: Depression


Smoking Status: Never smoker


Past Alcohol Use History: None Reported


Past Drug Use History: None Reported





- Past Family History


  ** Mother


Family Medical History: Coronary Artery Disease (CAD)


Additional Family Medical History / Comment(s): heart problems





Medications and Allergies


                                Home Medications











 Medication  Instructions  Recorded  Confirmed  Type


 


OXcarbazepine [Trileptal] 600 mg PO BID@0900,1700 07/08/15 01/03/20 History


 


Omeprazole [PriLOSEC] 20 mg PO Q48H 07/08/15 01/03/20 History


 


Carvedilol [Coreg] 25 mg PO BID@0900,1700 09/14/17 01/03/20 History


 


Ferrous Sulfate [Iron (65  mg PO DAILY@0900 12/19/17 01/03/20 History





Elemental)]    


 


Ondansetron [Zofran] 4 mg PO Q6H PRN 12/19/17 01/02/20 History


 


hydrALAZINE HCL [Apresoline] 25 mg PO TID@0900,1300,2100 12/19/17 01/03/20 

History


 


hydrOXYzine HCL [Atarax] 25 mg PO BID@0900,1700 12/19/17 01/03/20 History


 


ALPRAZolam [Xanax] 0.25 mg PO TID@0900,1300,2100 #20 12/28/17 01/03/20 Rx





 tab   


 


HYDROmorphone HCL [Dilaudid] 4 mg PO Q4HR PRN #15 tablet 12/28/17 01/02/20 Rx


 


Acetaminophen Tab [Tylenol Tab] 500 mg PO DAILY@1300 01/03/20 01/03/20 History


 


Acetaminophen Tab [Tylenol Tab] 500 mg PO Q6HR PRN 01/03/20 01/03/20 History


 


Bisacodyl 10 mg PO DAILY PRN 01/03/20 01/03/20 History


 


Bisacodyl [Dulcolax] 10 mg RECTAL DAILY PRN 01/03/20 01/03/20 History


 


DULoxetine HCL [Cymbalta] 60 mg PO DAILY@0900 01/03/20 01/03/20 History


 


Furosemide [Lasix] 40 mg PO DAILY@1300 01/03/20 01/03/20 History


 


Furosemide [Lasix] 80 mg PO DAILY@0600 01/03/20 01/03/20 History


 


Malika-Lanta Susp 20 ml PO Q2H PRN 01/03/20 01/03/20 History


 


Glycopyrrolate/Formoterol Fum 2 puff INHALATION RT-BID@0600,1700 01/03/20 01/03/20 History





[Bevespi Aerosphere Inhaler]    


 


Ipratropium-Albuterol Nebulize 3 ml INHALATION RT-Q4H PRN 01/03/20 01/03/20 

History





[Duoneb 0.5 mg-3 mg/3 ml Soln]    


 


Ipratropium-Albuterol Nebulize 3 ml INHALATION RT-TID 01/03/20 01/03/20 History





[Duoneb 0.5 mg-3 mg/3 ml Soln]    


 


Lisinopril [Zestril] 10 mg PO DAILY@0900 01/03/20 01/03/20 History


 


Magnesium 400mg 400 mg PO BID@0900,2100 01/03/20 01/03/20 History


 


Magnesium Hydroxide [Milk of 2,400 mg PO DAILY PRN 01/03/20 01/03/20 History





Magnesia]    


 


Melatonin 10 mg PO HS@2100 01/03/20 01/03/20 History


 


Multivitamins, Thera [Multivitamin 1 tab PO DAILY@1300 01/03/20 01/03/20 History





(formulary)]    


 


Na Phos,M-B/Na Phos,Di-Ba [Fleet 133 ml RECTAL DAILY PRN 01/03/20 01/03/20 

History





Adult]    


 


Naproxen 500 mg PO BID@0900,1700 01/03/20 01/03/20 History


 


Nystatin 1 applic TOPICAL DAILY PRN 01/03/20 01/03/20 History


 


Pregabalin [Lyrica] 75 mg PO BID@0900,2100 01/03/20 01/03/20 History


 


Sennosides [Senokot] 17.2 mg PO DAILY@2100 01/03/20 01/03/20 History


 


Sodium Chloride Tab 1 gm PO TID@0900,1300,2100 01/03/20 01/03/20 History


 


Triamcinolone 0.1% Cream [Kenalog 1 applicatio TOPICAL DAILY PRN 01/03/20 01/03/20 History





0.1% Cream]    


 


fentaNYL 100MCG/HR PATCH 100 mcg TRANSDERM Q72H 01/03/20 01/03/20 History





[Duragesic 100MCG/HR]    


 


guaiFENesin [guaiFENesin Oral 200 mg PO Q4H PRN 01/03/20 01/03/20 History





Solution]    








                                    Allergies











Allergy/AdvReac Type Severity Reaction Status Date / Time


 


codeine Allergy  Dyspnea, Verified 01/03/20 08:32





   Lip  





   Swelling  


 


wheat Allergy  Swelling Verified 01/03/20 08:32














Physical Exam


Vitals: 


                                   Vital Signs











  Temp Pulse Pulse Resp BP Pulse Ox


 


 01/03/20 14:45    69  14  148/69  96


 


 01/03/20 14:30  97.5 F L   71  18  160/80  93 L


 


 01/03/20 12:05   60    


 


 01/03/20 12:00  98.9 F   56 L  16  128/66  99


 


 01/03/20 11:57   57 L    


 


 01/03/20 08:00  98.9 F   64  18  138/88  98


 


 01/03/20 04:00  98.2 F   62  18  121/60  97


 


 01/03/20 00:00     20  


 


 01/02/20 23:06  98.4 F   60  20  117/69  98








                                Intake and Output











 01/03/20 01/03/20 01/03/20





 06:59 14:59 22:59


 


Intake Total  250 


 


Balance  250 


 


Intake:   


 


  IV  250 


 


Other:   


 


  Voiding Method Diaper Diaper 


 


  # Voids  2 


 


  Weight 98.5 kg  














PHYSICAL EXAMINATION: 


Patient is lying in the bed comfortably, no acute distress, awake alert and o

riented.  Mild cognitive impairment. 


HEENT: Normocephalic. Neck is supple. Pupils reactive. Nostrils clear. Oral 

cavity is moist. Ears reveal no drainage. 


Neck reveals no JVD, carotid bruits, or thyromegaly. 


CHEST EXAMINATION: Trachea is central. Symmetrical expansion.  Bibasilar 

diminished air entry.  No wheezing.  Lung fields clear to auscultation and 

percussion. 


CARDIAC: Normal S1, S2 with no gallops. No murmurs 


ABDOMEN: Soft. Bowel sounds normal. No organomegaly. No abdominal bruits. 


Extremities: reveal no edema.  No clubbing or cyanosis


Neurologically awake, alert, oriented x3 with well-coordinated movements.  No 

focal deficits noted.  Mild dementia.


Skin: No rash or skin lesions. 


Psychiatric: Coperative.  Nonsuicidal


Musculoskeletal: No joint swelling or deformity.  Normal range of motion.








Results


CBC & Chem 7: 


                                 01/03/20 06:33





                                 01/03/20 06:33


Labs: 


                  Abnormal Lab Results - Last 24 Hours (Table)











  01/03/20 01/03/20 01/03/20 Range/Units





  06:33 06:33 06:33 


 


RBC  3.06 L    (3.80-5.40)  m/uL


 


Hgb  8.9 L    (11.4-16.0)  gm/dL


 


Hct  28.1 L    (34.0-46.0)  %


 


Retic Count    2.4 H  (0.5-2.0)  %


 


Carbon Dioxide   34 H   (22-30)  mmol/L


 


BUN   28 H   (7-17)  mg/dL


 


Calcium   7.7 L   (8.4-10.2)  mg/dL














Thrombosis Risk Factor Assmnt





- DVT/VTE Prophylaxis


DVT/VTE Prophylaxis: Pharmacologic Prophylaxis ordered





- Choose All That Apply


Other Risk Factors: Yes


Each Risk Factor Represents 3 Points: Age 75 years or older


Other congenital or acquired thrombophilia - If yes, enter type in comment: No


Thrombosis Risk Factor Assessment Total Risk Factor Score: 3


Thrombosis Risk Factor Assessment Level: Moderate Risk





Assessment and Plan


Assessment: 





Generalized weakness and lethargy likely multifactorial due to anemia and now 

according pain medications.


Mild acute blood loss anemia likely GI bleed.  FOBT was positive.  Status post 

EGD.  Continue with Protonix and sucralfate.


Chronic CHF with diastolic dysfunction.


Mild to moderate cardiomegaly


Hypertension


Hyperlipidemia


Osteoarthritis


GERD


History of CVA/TIA


History of seizure disorder


Depression


Chronic pain syndrome and polypharmacy


Mild dementia


DVT prophylaxis with heparin subcu





Plan:


Patient will be continued on PPI and sucralfate.  Monitor H&H.  Status post EGD.

 Follow biopsy report.  Continued on iron supplementation.


Coreg 25 mg twice a day, is on held due to sinus bradycardia.  Heart rate is 

improving now.  Will be started on low-dose Coreg.


Continue the home medications and limit narcotic pain medication use.


Gastroenterology is following.  Further recommendations based on the clinical 

course.  Prognosis is guarded.  Discussed with her daughter at bedside in 

detail.








Time with Patient: Greater than 30

## 2020-01-03 NOTE — P.PCN
Date of Procedure: 01/03/20


Description of Procedure: 





BRIEF HISTORY: 


Patient is a 69-year-old female with multiple medical comorbidities who 

presented to the hospital for evaluation of anemia with plan for EGD for further

investigation.  The patient has a history of chronic anemia treated 

intermittently with iron replacement therapy.  Previously she has undergone EGD 

and colonoscopy with findings of hiatal hernia per her recollection.  Denies any

signs or symptoms of GI bleeding but does report that bowel movements are dark 

at baseline on iron supplementation therapy.





PROCEDURE PERFORMED: 


Esophagogastroduodenoscopy with biopsy.





PREOPERATIVE DIAGNOSIS: 


Iron deficiency anemia, anemia acute blood loss. 





ESTIMATED BLOOD LOSS: 


Minimal.





IV sedation per anesthesia. 





PROCEDURE: 


After informed consent was obtained, the patient  was brought into the endoscopy

unit. IV sedation was administered by Anesthesia under continuous monitoring. 

Initially the Olympus GIF-190 video endoscope was inserted into the mouth. 

Esophagus intubated without any difficulty. It was gradually advanced into the 

stomach and duodenum and carefully examined. The bulb and the second part of the

duodenum appeared normal, with biopsies taken. The scope at this time was 

withdrawn to the stomach, adequately insufflated with air, and upon careful 

examination, mucosa of the antrum, body, cardia and the fundus appeared normal, 

except for some mild scattered erythema in the antrum and body suggestive of 

mild gastritis with biopsies taken. The scope was then withdrawn into the 

esophagus. The GE junction was located at 35 cm from the incisors, with a 4 cm 

hiatal hernia noted. The esophagus was significant for superficial erosions and 

friability in the distal esophagus consistent with LA grade D esophagitis which 

was biopsied.  The patient tolerated the procedure well.





IMPRESSION: 


1.  LA grade D distal esophagitis, biopsied.


2.  Mild gastritis antrum and body, biopsied.


3.  Large hiatal hernia.





RECOMMENDATIONS: 


The findings of this examination were discussed with the patient and her 

.  Okay for full liquid diet, can advance tomorrow if stable.  Continue 

Protonix 40 mg twice daily.  Will add Carafate 3 times a day before meals and at

bedtime.  Will pathology from biopsies.  Otherwise no further evaluation from 

the GI service as anemia appears to be chronic, if patient requires colonoscopy 

would recommend follow-up as outpatient.

## 2020-01-04 LAB
ANION GAP SERPL CALC-SCNC: 4 MMOL/L
BASOPHILS # BLD AUTO: 0 K/UL (ref 0–0.2)
BASOPHILS NFR BLD AUTO: 0 %
BUN SERPL-SCNC: 14 MG/DL (ref 7–17)
CALCIUM SPEC-MCNC: 8.4 MG/DL (ref 8.4–10.2)
CHLORIDE SERPL-SCNC: 104 MMOL/L (ref 98–107)
CO2 SERPL-SCNC: 29 MMOL/L (ref 22–30)
EOSINOPHIL # BLD AUTO: 0.4 K/UL (ref 0–0.7)
EOSINOPHIL NFR BLD AUTO: 7 %
ERYTHROCYTE [DISTWIDTH] IN BLOOD BY AUTOMATED COUNT: 3.48 M/UL (ref 3.8–5.4)
ERYTHROCYTE [DISTWIDTH] IN BLOOD: 15 % (ref 11.5–15.5)
GLUCOSE SERPL-MCNC: 90 MG/DL (ref 74–99)
HCT VFR BLD AUTO: 30.2 % (ref 34–46)
HGB BLD-MCNC: 9.8 GM/DL (ref 11.4–16)
LYMPHOCYTES # SPEC AUTO: 0.9 K/UL (ref 1–4.8)
LYMPHOCYTES NFR SPEC AUTO: 17 %
MCH RBC QN AUTO: 28.3 PG (ref 25–35)
MCHC RBC AUTO-ENTMCNC: 32.6 G/DL (ref 31–37)
MCV RBC AUTO: 87 FL (ref 80–100)
MONOCYTES # BLD AUTO: 0.3 K/UL (ref 0–1)
MONOCYTES NFR BLD AUTO: 5 %
NEUTROPHILS # BLD AUTO: 3.5 K/UL (ref 1.3–7.7)
NEUTROPHILS NFR BLD AUTO: 68 %
PLATELET # BLD AUTO: 146 K/UL (ref 150–450)
POTASSIUM SERPL-SCNC: 4.2 MMOL/L (ref 3.5–5.1)
SODIUM SERPL-SCNC: 137 MMOL/L (ref 137–145)
WBC # BLD AUTO: 5.2 K/UL (ref 3.8–10.6)

## 2020-01-04 RX ADMIN — Medication SCH MG: at 20:44

## 2020-01-04 RX ADMIN — HYDROCODONE BITARTRATE AND ACETAMINOPHEN PRN EACH: 5; 325 TABLET ORAL at 05:52

## 2020-01-04 RX ADMIN — SUCRALFATE SCH GM: 1 TABLET ORAL at 12:03

## 2020-01-04 RX ADMIN — PANTOPRAZOLE SODIUM SCH MG: 40 TABLET, DELAYED RELEASE ORAL at 05:53

## 2020-01-04 RX ADMIN — IPRATROPIUM BROMIDE AND ALBUTEROL SULFATE PRN ML: .5; 3 SOLUTION RESPIRATORY (INHALATION) at 15:47

## 2020-01-04 RX ADMIN — SUCRALFATE SCH GM: 1 TABLET ORAL at 20:45

## 2020-01-04 RX ADMIN — ATORVASTATIN CALCIUM SCH MG: 10 TABLET, FILM COATED ORAL at 20:44

## 2020-01-04 RX ADMIN — IPRATROPIUM BROMIDE AND ALBUTEROL SULFATE PRN ML: .5; 3 SOLUTION RESPIRATORY (INHALATION) at 08:45

## 2020-01-04 RX ADMIN — FUROSEMIDE SCH MG: 40 TABLET ORAL at 09:06

## 2020-01-04 RX ADMIN — PANTOPRAZOLE SODIUM SCH MG: 40 TABLET, DELAYED RELEASE ORAL at 15:10

## 2020-01-04 RX ADMIN — SUCRALFATE SCH GM: 1 TABLET ORAL at 05:53

## 2020-01-04 RX ADMIN — THERA TABS SCH EACH: TAB at 09:06

## 2020-01-04 RX ADMIN — IPRATROPIUM BROMIDE AND ALBUTEROL SULFATE PRN ML: .5; 3 SOLUTION RESPIRATORY (INHALATION) at 11:30

## 2020-01-04 RX ADMIN — HYDROCODONE BITARTRATE AND ACETAMINOPHEN PRN EACH: 5; 325 TABLET ORAL at 20:51

## 2020-01-04 RX ADMIN — LISINOPRIL SCH MG: 10 TABLET ORAL at 09:06

## 2020-01-04 RX ADMIN — ONDANSETRON HYDROCHLORIDE PRN MG: 4 TABLET, FILM COATED ORAL at 15:10

## 2020-01-04 RX ADMIN — SERTRALINE HYDROCHLORIDE SCH MG: 50 TABLET, FILM COATED ORAL at 09:06

## 2020-01-04 RX ADMIN — SUCRALFATE SCH GM: 1 TABLET ORAL at 17:47

## 2020-01-04 NOTE — P.CONS
History of Present Illness





- Reason for Consult


Consult date: 01/03/20


Anemia


Requesting physician: Heber Juares





- Chief Complaint


Anemia





- History of Present Illness





69-year-old female with multiple medical comorbidities including hypertension, 

hyperlipidemia, osteoarthritis, congestive heart failure and anemia presented to

the hospital for evaluation of anemia.  The patient was at her extended care 

facility and reportedly had a blood draw with hemoglobin found to be 7.9.  

Patient has a known history of chronic anemia for which she reports intermittent

iron therapy in the past.  Baseline tends to be a hemoglobin of 8-10.  She andrés

es any signs or symptoms of bleeding with no gross hematemesis or hematochezia. 

She does report bowel movements have been dark but reports that this is usually 

secondary to iron therapy.  She is on Prilosec daily.  She denies any NSAID use.

 She reports taking Tylenol for pains.  She believes her last EGD and 

colonoscopy were over 5 years ago in significant for a hiatal hernia.  On 

presentation she was found to have a WBC 4.4, and a hemoglobin of 8.9.











Review of Systems





REVIEW OF SYSTEMS:


CONSTITUTIONAL: Denies any fevers, chills, weight change or fatigue.


CARDIOVASCULAR: Denies any chest pain, palpitations high or low blood pressures


RESPIRATORY: Denies any shortness of breath, hemoptysis or cough.  


GENITOURINARY:  No dysuria or hematuria. 


MUSCULOSKELETAL: No weakness reported. 


SKIN: Denies any new rashes or lesions, jaundice or pallor. 


PSYCHIATRIC: Denies any depression or anxiety. 


NEUROLOGY: Denies headache, denies any new focal deficits. 


EARS/NOSE/THROAT: No recent hearing change, congestion, nasal discharge or sore 

throat.


EYES: No pain in eyes, discharge or change in vision. 


GASTROINTESTINAL: As per HPI.





Past Medical History


Past Medical History: Asthma, Chest Pain / Angina, Heart Failure, CVA/TIA, 

GERD/Reflux, Hyperlipidemia, Hypertension, Osteoarthritis (OA), Seizure Disorder


Additional Past Medical History / Comment(s): anemia, SOB, dizziness, hx. hiatal

hernia, herniated discs back, TIA years ago, last seizure 8 yrs. ago


History of Any Multi-Drug Resistant Organisms: None Reported


Past Surgical History: Heart Catheterization, Hysterectomy


Additional Past Surgical History / Comment(s): fatty tumor removed back, hole in

heart repaired w/"patch"


Past Anesthesia/Blood Transfusion Reactions: Postoperative Nausea & Vomiting 

(PONV)


Past Psychological History: Depression


Smoking Status: Never smoker


Past Alcohol Use History: None Reported


Past Drug Use History: None Reported





- Past Family History


  ** Mother


Family Medical History: Coronary Artery Disease (CAD)


Additional Family Medical History / Comment(s): heart problems





Medications and Allergies


                                Home Medications











 Medication  Instructions  Recorded  Confirmed  Type


 


OXcarbazepine [Trileptal] 600 mg PO BID@0900,1700 07/08/15 01/03/20 History


 


Omeprazole [PriLOSEC] 20 mg PO Q48H 07/08/15 01/03/20 History


 


Carvedilol [Coreg] 25 mg PO BID@0900,1700 09/14/17 01/03/20 History


 


Ferrous Sulfate [Iron (65  mg PO DAILY@0900 12/19/17 01/03/20 History





Elemental)]    


 


Ondansetron [Zofran] 4 mg PO Q6H PRN 12/19/17 01/02/20 History


 


hydrALAZINE HCL [Apresoline] 25 mg PO TID@0900,1300,2100 12/19/17 01/03/20 

History


 


hydrOXYzine HCL [Atarax] 25 mg PO BID@0900,1700 12/19/17 01/03/20 History


 


ALPRAZolam [Xanax] 0.25 mg PO TID@0900,1300,2100 #20 12/28/17 01/03/20 Rx





 tab   


 


HYDROmorphone HCL [Dilaudid] 4 mg PO Q4HR PRN #15 tablet 12/28/17 01/02/20 Rx


 


Acetaminophen Tab [Tylenol Tab] 500 mg PO DAILY@1300 01/03/20 01/03/20 History


 


Acetaminophen Tab [Tylenol Tab] 500 mg PO Q6HR PRN 01/03/20 01/03/20 History


 


Bisacodyl 10 mg PO DAILY PRN 01/03/20 01/03/20 History


 


Bisacodyl [Dulcolax] 10 mg RECTAL DAILY PRN 01/03/20 01/03/20 History


 


DULoxetine HCL [Cymbalta] 60 mg PO DAILY@0900 01/03/20 01/03/20 History


 


Furosemide [Lasix] 40 mg PO DAILY@1300 01/03/20 01/03/20 History


 


Furosemide [Lasix] 80 mg PO DAILY@0600 01/03/20 01/03/20 History


 


Malika-Lanta Susp 20 ml PO Q2H PRN 01/03/20 01/03/20 History


 


Glycopyrrolate/Formoterol Fum 2 puff INHALATION RT-BID@0600,1700 01/03/20 01/03/20 History





[Bevespi Aerosphere Inhaler]    


 


Ipratropium-Albuterol Nebulize 3 ml INHALATION RT-Q4H PRN 01/03/20 01/03/20 

History





[Duoneb 0.5 mg-3 mg/3 ml Soln]    


 


Ipratropium-Albuterol Nebulize 3 ml INHALATION RT-TID 01/03/20 01/03/20 History





[Duoneb 0.5 mg-3 mg/3 ml Soln]    


 


Lisinopril [Zestril] 10 mg PO DAILY@0900 01/03/20 01/03/20 History


 


Magnesium 400mg 400 mg PO BID@0900,2100 01/03/20 01/03/20 History


 


Magnesium Hydroxide [Milk of 2,400 mg PO DAILY PRN 01/03/20 01/03/20 History





Magnesia]    


 


Melatonin 10 mg PO HS@2100 01/03/20 01/03/20 History


 


Multivitamins, Thera [Multivitamin 1 tab PO DAILY@1300 01/03/20 01/03/20 History





(formulary)]    


 


Na Phos,M-B/Na Phos,Di-Ba [Fleet 133 ml RECTAL DAILY PRN 01/03/20 01/03/20 Hi

story





Adult]    


 


Naproxen 500 mg PO BID@0900,1700 01/03/20 01/03/20 History


 


Nystatin 1 applic TOPICAL DAILY PRN 01/03/20 01/03/20 History


 


Pregabalin [Lyrica] 75 mg PO BID@0900,2100 01/03/20 01/03/20 History


 


Sennosides [Senokot] 17.2 mg PO DAILY@2100 01/03/20 01/03/20 History


 


Sodium Chloride Tab 1 gm PO TID@0900,1300,2100 01/03/20 01/03/20 History


 


Triamcinolone 0.1% Cream [Kenalog 1 applicatio TOPICAL DAILY PRN 01/03/20 01/03/20 History





0.1% Cream]    


 


fentaNYL 100MCG/HR PATCH 100 mcg TRANSDERM Q72H 01/03/20 01/03/20 History





[Duragesic 100MCG/HR]    


 


guaiFENesin [guaiFENesin Oral 200 mg PO Q4H PRN 01/03/20 01/03/20 History





Solution]    








                                    Allergies











Allergy/AdvReac Type Severity Reaction Status Date / Time


 


codeine Allergy  Dyspnea, Verified 01/03/20 08:32





   Lip  





   Swelling  


 


wheat Allergy  Swelling Verified 01/03/20 08:32














Physical Exam


Vitals: 


                                   Vital Signs











  Temp Pulse Pulse Resp BP Pulse Ox


 


 01/03/20 11:57   57 L    


 


 01/03/20 08:00  98.9 F   64  18  138/88  98


 


 01/03/20 04:00  98.2 F   62  18  121/60  97


 


 01/03/20 00:00     20  


 


 01/02/20 23:06  98.4 F   60  20  117/69  98








                                Intake and Output











 01/02/20 01/03/20 01/03/20





 22:59 06:59 14:59


 


Other:   


 


  Voiding Method  Diaper Diaper


 


  Weight  98.5 kg 














On physical examination, patient appears comfortable in no apparent distress. 


HEAD: Normocephalic, atraumatic. 


EYES: No scleral icterus. No conjunctival injection. 


MOUTH: No lesions, tongue midline. 


NECK: Trachea midline, no gross abnormalities. 


CHEST: Clear to auscultation with no wheezing or rhonchi appreciated. 


HEART: S1-S2 appreciated, no murmurs appreciated. 


ABDOMEN: Soft, obese. Bowel sounds are positive. No organomegaly.  No guarding 

or rigidity.


EXTREMITIES: No pedal edema. 


SKIN: No rashes, no jaundice. 


NEUROLOGIC: Alert and oriented x3.  No focal deficits. 





Results


CBC & Chem 7: 


                                 01/04/20 06:09





                                 01/04/20 06:09


Labs: 


                  Abnormal Lab Results - Last 24 Hours (Table)











  01/03/20 01/03/20 01/03/20 Range/Units





  06:33 06:33 06:33 


 


RBC  3.06 L    (3.80-5.40)  m/uL


 


Hgb  8.9 L    (11.4-16.0)  gm/dL


 


Hct  28.1 L    (34.0-46.0)  %


 


Retic Count    2.4 H  (0.5-2.0)  %


 


Carbon Dioxide   34 H   (22-30)  mmol/L


 


BUN   28 H   (7-17)  mg/dL


 


Calcium   7.7 L   (8.4-10.2)  mg/dL














Assessment and Plan


(1) Iron deficiency anemia


Narrative/Plan: 


69-year-old female with a known history of chronic anemia with associated iron 

deficiency for which she intermittently takes iron supplementation per her 

history who presented for evaluation of low hemoglobin.  We will of patient's 

records hemoglobin tends to run in 8-10 range.  She denies any signs or symptoms

of GI bleeding but does report that her bowel movements tend to be dark while on

iron supplements.  Last EGD and colonoscopy over 5 years ago and significant for

hernia per her recollection.  She denies any symptoms of dysphagia, odynophagia,

reflux, or change in bowel habits or blood per rectum.  Unknown etiology, likely

multifactorial.  Chronic disease, cannot rule out GI bleed.


Current Visit: Yes   Status: Acute   Code(s): D50.9 - IRON DEFICIENCY ANEMIA, 

UNSPECIFIED   SNOMED Code(s): 28271415


   


Plan: 





Supportive care


Nothing by mouth


Protonix 40 mg twice a day


Sucralfate before meals daily at bedtime


Monitor hemoglobin and hematocrit and transfuse as needed


Plan for EGD further recommendations pending findings


Thank you for allowing us to participate in the care of this patient

## 2020-01-04 NOTE — P.PN
Subjective


Progress Note Date: 01/04/20


Principal diagnosis: 





GI bleed


Elevated troponin level





Patient is a 69-year-old female with a known history of chronic CHF, 

cardiomegaly on follow-up with cardiology as an outpatient, hypertension, 

hyperlipidemia, osteoarthritis and depression who is currently staying get 

extended care facility was sent to [facility] initially due to patient

being very lethargic and drowsy and more sleepy than usual.  Patient was found 

to have a hemoglobin level IX.1 and dropped down to 8.6.  Patient had chest x-

ray showed no acute cardiopulmonary process.  Mild cardiomegaly.  BNP is not 

elevated at 82 and troponin level is 0.0-8.  Patient was found have bradycardic 

with heart rate in 40s when she was presented to [facility].


FOBT was positive.  Patient was transferred to [facility] due 

to possible GI bleed and further evaluation by gastroenterology. 


Patient had EGD today morning.  Hemoglobin was 8.9. patient is also found to 

have iron deficiency as per iron profile.


1.  LA grade D distal esophagitis, biopsied.


2.  Mild gastritis antrum and body, biopsied.


3.  Large hiatal hernia.


Currently patient denied any complaints of chest pain or worsening shortness of 

breath.  No nausea vomiting or abdominal pain.  Patient is more awake and 

oriented now.


Patient does take Dilaudid by mouth, fentanyl patch for pain management.  

Patient is also on Cymbalta at home.


Does have mild cognitive impairment.


Coreg 25 mg twice daily.  Which is held due to sinus bradycardia.





01/04/2020


Patient is complaining of nausea and episodes of vomiting today.  Denied any 

chest pain.  No diaphoresis or dizziness or lightheadedness..  Hemoglobin is 

trending up today.  Patient is being converted on Protonix and Carafate.  

Ferrous sulfate tablets were held due to nausea and vomiting.


Patient was found to have elevated troponin level 0.078 and 0.118.  Cardiology 

was consulted for evaluation.


Patient has been afebrile.  No cough or sputum production.





Active Medications





Hydrocodone Bitart/Acetaminophen (Norco 5-325)  1 each PO Q6HR PRN


   PRN Reason: Pain


   Last Admin: 01/04/20 20:51 Dose:  1 each


   Documented by: 


Albuterol/Ipratropium (Duoneb 0.5 Mg-3 Mg/3 Ml Soln)  3 ml INHALATION Q4HR PRN


   PRN Reason: Shortness Of Breath Or Wheezing


   Last Admin: 01/04/20 15:47 Dose:  3 ml


   Documented by: 


Alprazolam (Xanax)  0.25 mg PO TID@0900,1300,2100 [facility]


   Last Admin: 01/04/20 20:44 Dose:  0.25 mg


   Documented by: 


Amlodipine Besylate (Norvasc)  5 mg PO DAILY [facility]


Atorvastatin Calcium (Lipitor)  10 mg PO DAILY@2100 [facility]


   Last Admin: 01/04/20 20:44 Dose:  10 mg


   Documented by: 


Furosemide (Lasix)  40 mg PO DAILY [facility]


   Last Admin: 01/04/20 09:06 Dose:  40 mg


   Documented by: 


Guaifenesin (Robitussin)  200 mg PO Q6H PRN


   PRN Reason: Cough


   Last Admin: 01/03/20 16:48 Dose:  200 mg


   Documented by: 


Hydralazine HCl (Apresoline)  25 mg PO TID@0900,1300,2100 [facility]


   Last Admin: 01/04/20 20:44 Dose:  25 mg


   Documented by: 


Hydromorphone HCl (Dilaudid)  4 mg PO Q4HR PRN


   PRN Reason: Pain


   Last Admin: 01/04/20 12:03 Dose:  4 mg


   Documented by: 


Lisinopril (Zestril)  10 mg PO DAILY [facility]


   Last Admin: 01/04/20 09:06 Dose:  10 mg


   Documented by: 


Melatonin (Melatonin)  10 mg PO HS@2100 [facility]


   Last Admin: 01/04/20 20:44 Dose:  10 mg


   Documented by: 


Multivitamins (Theragran)  1 each PO DAILY@1300 [facility]


   Last Admin: 01/04/20 09:06 Dose:  1 each


   Documented by: 


Ondansetron HCl (Zofran)  4 mg PO Q6H PRN


   PRN Reason: Nausea


   Last Admin: 01/04/20 15:10 Dose:  4 mg


   Documented by: 


Oxcarbazepine (Trileptal)  600 mg PO BID@0900,1700 [facility]


   Last Admin: 01/04/20 17:50 Dose:  600 mg


   Documented by: 


Pantoprazole Sodium (Protonix)  40 mg PO AC-BID [facility]


   Last Admin: 01/04/20 15:10 Dose:  40 mg


   Documented by: 


Sertraline HCl (Zoloft)  50 mg PO DAILY@0900 [facility]


   Last Admin: 01/04/20 09:06 Dose:  50 mg


   Documented by: 


Sucralfate (Carafate)  1 gm PO ACHS [facility]


   Last Admin: 01/04/20 20:45 Dose:  1 gm


   Documented by: 











Objective





- Vital Signs


Vital signs: 


                                   Vital Signs











Temp  98.2 F   01/04/20 11:59


 


Pulse  61   01/04/20 12:00


 


Resp  18   01/04/20 12:00


 


BP  167/77   01/04/20 11:59


 


Pulse Ox  97   01/04/20 11:59








                                 Intake & Output











 01/03/20 01/04/20 01/04/20





 18:59 06:59 18:59


 


Intake Total 250  


 


Balance 250  


 


Intake:   


 


    


 


Other:   


 


  Voiding Method Diaper Diaper Diaper


 


  # Voids 2  


 


  # Bowel Movements   1














- Exam





PHYSICAL EXAMINATION: 


Patient is lying in the bed comfortably, no acute distress, awake alert and 

oriented.  Mild cognitive impairment. 


HEENT: Normocephalic. Neck is supple. Pupils reactive. Nostrils clear. Oral 

cavity is moist. Ears reveal no drainage. 


Neck reveals no JVD, carotid bruits, or thyromegaly. 


CHEST EXAMINATION: Trachea is central. Symmetrical expansion.  Bibasilar 

diminished air entry.  No wheezing.  Lung fields clear to auscultation and 

percussion. 


CARDIAC: Normal S1, S2 with no gallops. No murmurs 


ABDOMEN: Soft. Bowel sounds normal. No organomegaly. No abdominal bruits. 


Extremities: reveal no edema.  No clubbing or cyanosis


Neurologically awake, alert, oriented x3 with well-coordinated movements.  No 

focal deficits noted.  Mild dementia.


Skin: No rash or skin lesions. 


Psychiatric: Coperative.  Nonsuicidal


Musculoskeletal: No joint swelling or deformity.  Normal range of motion.





- Labs


CBC & Chem 7: 


                                 01/04/20 06:09





                                 01/04/20 06:09


Labs: 


                  Abnormal Lab Results - Last 24 Hours (Table)











  01/03/20 01/04/20 01/04/20 Range/Units





  06:33 06:09 06:09 


 


RBC    3.48 L  (3.80-5.40)  m/uL


 


Hgb    9.8 L  (11.4-16.0)  gm/dL


 


Hct    30.2 L  (34.0-46.0)  %


 


Plt Count    146 L  (150-450)  k/uL


 


Lymphocytes #    0.9 L  (1.0-4.8)  k/uL


 


Iron  27 L    ()  ug/dL


 


% Saturation  11.79 L    (12.00-45.00)   


 


Troponin I   0.078 H*   (0.000-0.034)  ng/mL














Assessment and Plan


Assessment: 





Generalized weakness and lethargy likely multifactorial due to anemia and now 

according pain medications.


Mild acute blood loss anemia likely GI bleed.  FOBT was positive.  Status post 

EGD.  Continue with Protonix and sucralfate.


Elevated troponin level possible demand mismatch.  Acute non-ST elevated MI 

cannot be excluded.


Chronic CHF with diastolic dysfunction.


Mild to moderate cardiomegaly


Hypertension


Hyperlipidemia


Osteoarthritis


GERD


History of CVA/TIA


History of seizure disorder


Depression


Chronic pain syndrome and polypharmacy


Mild dementia


DVT prophylaxis with heparin subcu





Plan:


Patient will be continued on PPI and sucralfate.  Monitor H&H.  Status post EGD.

 Follow biopsy report.  Symptomatic management for nausea and vomiting..


Coreg 25 mg twice a day, is on held due to sinus bradycardia.  Heart rate is 

improving now.  Will be started on low-dose Coreg.


Continue the home medications and limit narcotic pain medication use.


Gastroenterology is following.  Cardiology was consulted due to elevated 

troponin level.


Further recommendations based on the clinical course.  Prognosis is guarded.  

Discussed with her daughter at bedside in detail.








Time with Patient: Greater than 30

## 2020-01-05 LAB
ANION GAP SERPL CALC-SCNC: 6 MMOL/L
BASOPHILS # BLD AUTO: 0 K/UL (ref 0–0.2)
BASOPHILS NFR BLD AUTO: 1 %
BUN SERPL-SCNC: 9 MG/DL (ref 7–17)
CALCIUM SPEC-MCNC: 8.1 MG/DL (ref 8.4–10.2)
CHLORIDE SERPL-SCNC: 101 MMOL/L (ref 98–107)
CO2 SERPL-SCNC: 27 MMOL/L (ref 22–30)
EOSINOPHIL # BLD AUTO: 0.1 K/UL (ref 0–0.7)
EOSINOPHIL NFR BLD AUTO: 3 %
ERYTHROCYTE [DISTWIDTH] IN BLOOD BY AUTOMATED COUNT: 2.97 M/UL (ref 3.8–5.4)
ERYTHROCYTE [DISTWIDTH] IN BLOOD: 14.8 % (ref 11.5–15.5)
GLUCOSE SERPL-MCNC: 70 MG/DL (ref 74–99)
HCT VFR BLD AUTO: 26.7 % (ref 34–46)
HGB BLD-MCNC: 8.6 GM/DL (ref 11.4–16)
LYMPHOCYTES # SPEC AUTO: 0.8 K/UL (ref 1–4.8)
LYMPHOCYTES NFR SPEC AUTO: 20 %
MCH RBC QN AUTO: 29 PG (ref 25–35)
MCHC RBC AUTO-ENTMCNC: 32.2 G/DL (ref 31–37)
MCV RBC AUTO: 89.8 FL (ref 80–100)
MONOCYTES # BLD AUTO: 0.2 K/UL (ref 0–1)
MONOCYTES NFR BLD AUTO: 5 %
NEUTROPHILS # BLD AUTO: 2.6 K/UL (ref 1.3–7.7)
NEUTROPHILS NFR BLD AUTO: 69 %
PLATELET # BLD AUTO: 175 K/UL (ref 150–450)
POTASSIUM SERPL-SCNC: 3.6 MMOL/L (ref 3.5–5.1)
SODIUM SERPL-SCNC: 134 MMOL/L (ref 137–145)
WBC # BLD AUTO: 3.8 K/UL (ref 3.8–10.6)

## 2020-01-05 RX ADMIN — PANTOPRAZOLE SODIUM SCH MG: 40 TABLET, DELAYED RELEASE ORAL at 15:29

## 2020-01-05 RX ADMIN — SERTRALINE HYDROCHLORIDE SCH MG: 50 TABLET, FILM COATED ORAL at 09:02

## 2020-01-05 RX ADMIN — PREGABALIN SCH MG: 75 CAPSULE ORAL at 20:15

## 2020-01-05 RX ADMIN — ATORVASTATIN CALCIUM SCH MG: 10 TABLET, FILM COATED ORAL at 20:15

## 2020-01-05 RX ADMIN — PREGABALIN SCH MG: 75 CAPSULE ORAL at 04:14

## 2020-01-05 RX ADMIN — SUCRALFATE SCH GM: 1 TABLET ORAL at 09:03

## 2020-01-05 RX ADMIN — LISINOPRIL SCH MG: 10 TABLET ORAL at 09:02

## 2020-01-05 RX ADMIN — Medication SCH MG: at 20:15

## 2020-01-05 RX ADMIN — THERA TABS SCH EACH: TAB at 09:03

## 2020-01-05 RX ADMIN — ONDANSETRON HYDROCHLORIDE PRN MG: 4 TABLET, FILM COATED ORAL at 04:21

## 2020-01-05 RX ADMIN — SUCRALFATE SCH GM: 1 TABLET ORAL at 20:22

## 2020-01-05 RX ADMIN — SUCRALFATE SCH GM: 1 TABLET ORAL at 12:47

## 2020-01-05 RX ADMIN — FUROSEMIDE SCH MG: 40 TABLET ORAL at 09:02

## 2020-01-05 RX ADMIN — PREGABALIN SCH MG: 75 CAPSULE ORAL at 09:06

## 2020-01-05 RX ADMIN — SUCRALFATE SCH GM: 1 TABLET ORAL at 17:10

## 2020-01-05 RX ADMIN — HYDROCODONE BITARTRATE AND ACETAMINOPHEN PRN EACH: 5; 325 TABLET ORAL at 01:46

## 2020-01-05 RX ADMIN — PANTOPRAZOLE SODIUM SCH MG: 40 TABLET, DELAYED RELEASE ORAL at 09:03

## 2020-01-05 RX ADMIN — ONDANSETRON HYDROCHLORIDE PRN MG: 4 TABLET, FILM COATED ORAL at 17:10

## 2020-01-05 NOTE — P.CRDCN
History of Present Illness


Consult date: 01/05/20


Requesting physician: Heber Juares


Reason for Consult (text): 





Normal troponin


Chief complaint: Anemia


History of present illness: 


This is a 69-year-old  female with documented history of chronic 

diastolic congestive heart failure, she sees Dr. VC Lane in the office 

regularly, history of hypertension, hyperlipidemia, osteoarthritis, depression, 

chronic anemia for which she takes iron supplementation at home.  She resides at

an extended care facility and [facility], patient had been more weak and 

lethargic than usual, some blood work was drawn and she was found to have a low 

hemoglobin down to 7.9, she apparently also had a chest x-ray performed which 

did not reveal any acute pulmonary process.  Lab work was also performed at 

[facility] which revealed revealed a sodium 140, potassium 4.5, BUN 32, 

creatinine 1.4, white blood cell count 5.3, hemoglobin 7.9, platelet count 152. 

Blood pressure at [facility] 103/80 with a heart rate of 60, afebrile,

magnesium 2.8, troponin 0.0-4 UA negative.  BNP 82.  There is no EKG performed 

at [facility], there is also not one performed on arrival here.  Patient was 

transferred to [facility] for further evaluation and treatment.  Patient was seen 

by GI service on arrival here, she did undergo an EGD which revealed LA grade D 

distal esophagitis, biopsies, mild gastritis antrum and body, biopsy and large 

hiatal hernia, she was continued on Protonix.  Blood pressure here 178/74 with a

heart rate in the 60s, 100% on 2 L of oxygen.  White blood cell count is normal,

hemoglobin here is 8.6, platelet count 175.  Sodium 134, potassium 3.6, BUN 9 

and creatinine 0.6.  Troponins 0.078, 0.118, 0.076.  Iron 27, TIBC 229, iron 

saturation 11.7.  Patient denies having any chest discomfort and states that her

breathing has been quite stable, she is just been feeling very weak and having 

symptoms of nausea with intermittent vomiting at home.  She does complain of 

back pain this morning and states that she has problems with her sciatic nerve.





Past Medical History


Past Medical History: Asthma, Chest Pain / Angina, Heart Failure, CVA/TIA, 

GERD/Reflux, Hyperlipidemia, Hypertension, Osteoarthritis (OA), Seizure Disorder


Additional Past Medical History / Comment(s): anemia, SOB, dizziness, hx. hiatal

hernia, herniated discs back, TIA years ago, last seizure 8 yrs. ago


History of Any Multi-Drug Resistant Organisms: None Reported


Past Surgical History: Heart Catheterization, Hysterectomy


Additional Past Surgical History / Comment(s): fatty tumor removed back, hole in

heart repaired w/"patch"


Past Anesthesia/Blood Transfusion Reactions: Postoperative Nausea & Vomiting 

(PONV)


Past Psychological History: Depression


Smoking Status: Never smoker


Past Alcohol Use History: None Reported


Past Drug Use History: None Reported





- Past Family History


  ** Mother


Family Medical History: Coronary Artery Disease (CAD)


Additional Family Medical History / Comment(s): heart problems





Medications and Allergies


                                Home Medications











 Medication  Instructions  Recorded  Confirmed  Type


 


OXcarbazepine [Trileptal] 600 mg PO BID@0900,1700 07/08/15 01/03/20 History


 


Omeprazole [PriLOSEC] 20 mg PO Q48H 07/08/15 01/03/20 History


 


Carvedilol [Coreg] 25 mg PO BID@0900,1700 09/14/17 01/03/20 History


 


Ferrous Sulfate [Iron (65  mg PO DAILY@0900 12/19/17 01/03/20 History





Elemental)]    


 


Ondansetron [Zofran] 4 mg PO Q6H PRN 12/19/17 01/02/20 History


 


hydrALAZINE HCL [Apresoline] 25 mg PO TID@0900,1300,2100 12/19/17 01/03/20 

History


 


hydrOXYzine HCL [Atarax] 25 mg PO BID@0900,1700 12/19/17 01/03/20 History


 


ALPRAZolam [Xanax] 0.25 mg PO TID@0900,1300,2100 #20 12/28/17 01/03/20 Rx





 tab   


 


HYDROmorphone HCL [Dilaudid] 4 mg PO Q4HR PRN #15 tablet 12/28/17 01/02/20 Rx


 


Acetaminophen Tab [Tylenol Tab] 500 mg PO DAILY@1300 01/03/20 01/03/20 History


 


Acetaminophen Tab [Tylenol Tab] 500 mg PO Q6HR PRN 01/03/20 01/03/20 History


 


Bisacodyl 10 mg PO DAILY PRN 01/03/20 01/03/20 History


 


Bisacodyl [Dulcolax] 10 mg RECTAL DAILY PRN 01/03/20 01/03/20 History


 


DULoxetine HCL [Cymbalta] 60 mg PO DAILY@0900 01/03/20 01/03/20 History


 


Furosemide [Lasix] 40 mg PO DAILY@1300 01/03/20 01/03/20 History


 


Furosemide [Lasix] 80 mg PO DAILY@0600 01/03/20 01/03/20 History


 


Malika-Lanta Susp 20 ml PO Q2H PRN 01/03/20 01/03/20 History


 


Glycopyrrolate/Formoterol Fum 2 puff INHALATION RT-BID@0600,1700 01/03/20 01/03/20 History





[Bevespi Aerosphere Inhaler]    


 


Ipratropium-Albuterol Nebulize 3 ml INHALATION RT-Q4H PRN 01/03/20 01/03/20 

History





[Duoneb 0.5 mg-3 mg/3 ml Soln]    


 


Ipratropium-Albuterol Nebulize 3 ml INHALATION RT-TID 01/03/20 01/03/20 History





[Duoneb 0.5 mg-3 mg/3 ml Soln]    


 


Lisinopril [Zestril] 10 mg PO DAILY@0900 01/03/20 01/03/20 History


 


Magnesium 400mg 400 mg PO BID@0900,2100 01/03/20 01/03/20 History


 


Magnesium Hydroxide [Milk of 2,400 mg PO DAILY PRN 01/03/20 01/03/20 History





Magnesia]    


 


Melatonin 10 mg PO HS@2100 01/03/20 01/03/20 History


 


Multivitamins, Thera [Multivitamin 1 tab PO DAILY@1300 01/03/20 01/03/20 History





(formulary)]    


 


Na Phos,M-B/Na Phos,Di-Ba [Fleet 133 ml RECTAL DAILY PRN 01/03/20 01/03/20 

History





Adult]    


 


Naproxen 500 mg PO BID@0900,1700 01/03/20 01/03/20 History


 


Nystatin 1 applic TOPICAL DAILY PRN 01/03/20 01/03/20 History


 


Pregabalin [Lyrica] 75 mg PO BID@0900,2100 01/03/20 01/03/20 History


 


Sennosides [Senokot] 17.2 mg PO DAILY@2100 01/03/20 01/03/20 History


 


Sodium Chloride Tab 1 gm PO TID@0900,1300,2100 01/03/20 01/03/20 History


 


Triamcinolone 0.1% Cream [Kenalog 1 applicatio TOPICAL DAILY PRN 01/03/20 01/03/20 History





0.1% Cream]    


 


fentaNYL 100MCG/HR PATCH 100 mcg TRANSDERM Q72H 01/03/20 01/03/20 History





[Duragesic 100MCG/HR]    


 


guaiFENesin [guaiFENesin Oral 200 mg PO Q4H PRN 01/03/20 01/03/20 History





Solution]    








                                    Allergies











Allergy/AdvReac Type Severity Reaction Status Date / Time


 


codeine Allergy  Dyspnea, Verified 01/03/20 08:32





   Lip  





   Swelling  


 


wheat Allergy  Swelling Verified 01/03/20 08:32














Physical Exam


Vitals: 


                                   Vital Signs











  Temp Pulse Pulse Resp BP Pulse Ox


 


 01/05/20 04:00  98.2 F   63  20  178/75  100


 


 01/05/20 00:00  98.0 F   69  18  162/70  97


 


 01/04/20 20:00  98.0 F   67  18  158/69  96


 


 01/04/20 16:00   60  58 L  18  


 


 01/04/20 15:49   58 L    


 


 01/04/20 15:18  98.1 F   58 L  18  169/70  95


 


 01/04/20 12:00    61  18  


 


 01/04/20 11:59  98.2 F   61  18  167/77  97


 


 01/04/20 11:45   59 L    


 


 01/04/20 11:30   55 L    


 


 01/04/20 08:56   68    


 


 01/04/20 08:48   60    








                                Intake and Output











 01/04/20 01/05/20 01/05/20





 22:59 06:59 14:59


 


Output Total  300 


 


Balance  -300 


 


Output:   


 


  Urine  300 


 


Other:   


 


  Voiding Method Diaper Diaper 


 


  # Voids 3  


 


  Weight  87 kg 














PHYSICAL EXAMINATION: 





GENERAL: 69-year-old  female in no acute distress at the time of my 

examination





HEENT: Head is atraumatic, normocephalic.  Pupils equal, round.  Sclera 

anicteric. Conjunctiva are clear.  Mucous membranes of the mouth are moist.  

Neck is supple.  There is no elevated jugular venous pressure.  No carotid  

bruit is heard.





HEART EXAMINATION: S1 and S2 systolic murmur is heard





CHEST EXAMINATION: Lungs are clear to auscultation and precussion. No chest wall

tenderness is noted on palpation or with deep breathing.





ABDOMEN:  Soft, mild generalized tenderness . Bowel sounds are heard. No 

organomegaly noted.


 


EXTREMITIES: 2+ peripheral pulses with no evidence of peripheral edema and no 

calf tenderness noted.





NEUROLOGIC patient is awake, alert and oriented 3 .


 


.


 








Results





                                 01/05/20 05:51





                                 01/05/20 05:51


                                 Cardiac Enzymes











  01/04/20 01/05/20 Range/Units





  18:02 01:03 


 


Troponin I  0.118 H*  0.076 H*  (0.000-0.034)  ng/mL








                                       CBC











  01/05/20 Range/Units





  05:51 


 


WBC  3.8  (3.8-10.6)  k/uL


 


RBC  2.97 L  (3.80-5.40)  m/uL


 


Hgb  8.6 L  (11.4-16.0)  gm/dL


 


Hct  26.7 L  (34.0-46.0)  %


 


Plt Count  175  (150-450)  k/uL








                          Comprehensive Metabolic Panel











  01/05/20 Range/Units





  05:51 


 


Sodium  134 L  (137-145)  mmol/L


 


Potassium  3.6  (3.5-5.1)  mmol/L


 


Chloride  101  ()  mmol/L


 


Carbon Dioxide  27  (22-30)  mmol/L


 


BUN  9  (7-17)  mg/dL


 


Creatinine  0.61  (0.52-1.04)  mg/dL


 


Glucose  70 L  (74-99)  mg/dL


 


Calcium  8.1 L  (8.4-10.2)  mg/dL








                               Current Medications











Generic Name Dose Route Start Last Admin





  Trade Name Freq  PRN Reason Stop Dose Admin


 


Albuterol/Ipratropium  3 ml  01/03/20 01:04  01/04/20 15:47





  Duoneb 0.5 Mg-3 Mg/3 Ml Soln  INHALATION   3 ml





  Q4HR PRN   Administration





  Shortness Of Breath Or Wheezing  


 


Alprazolam  0.25 mg  01/03/20 09:00  01/04/20 20:44





  Xanax  PO   0.25 mg





  TID@0900,1300,2100 JAVIER   Administration


 


Amlodipine Besylate  5 mg  01/05/20 09:00 





  Norvasc  PO  





  DAILY JAVIER  


 


Atorvastatin Calcium  10 mg  01/03/20 21:00  01/04/20 20:44





  Lipitor  PO   10 mg





  DAILY@2100 JAVIER   Administration


 


Fentanyl  1 patch  01/05/20 06:00  01/05/20 04:15





  Duragesic 100mcg/Hr Patch  TRANSDERM   1 patch





  Q72H JAVIER   Administration


 


Furosemide  40 mg  01/03/20 09:00  01/04/20 09:06





  Lasix  PO   40 mg





  DAILY JAVIER   Administration


 


Guaifenesin  200 mg  01/03/20 08:33  01/03/20 16:48





  Robitussin  PO   200 mg





  Q6H PRN   Administration





  Cough  


 


Hydralazine HCl  25 mg  01/04/20 09:00  01/04/20 20:44





  Apresoline  PO   25 mg





  TID@0900,1300,2100 JAVIER   Administration


 


Lisinopril  10 mg  01/03/20 09:00  01/04/20 09:06





  Zestril  PO   10 mg





  DAILY JAVIER   Administration


 


Melatonin  10 mg  01/04/20 21:00  01/04/20 20:44





  Melatonin  PO   10 mg





  HS@2100 JAVIER   Administration


 


Multivitamins  1 each  01/04/20 13:00  01/04/20 09:06





  Theragran  PO   1 each





  DAILY@1300 JAVIER   Administration


 


Ondansetron HCl  4 mg  01/03/20 01:04  01/05/20 04:21





  Zofran  PO   4 mg





  Q6H PRN   Administration





  Nausea  


 


Oxcarbazepine  600 mg  01/03/20 09:00  01/04/20 17:50





  Trileptal  PO   600 mg





  BID@0900,1700 JAVIER   Administration


 


Pantoprazole Sodium  40 mg  01/03/20 17:30  01/04/20 15:10





  Protonix  PO   40 mg





  AC-BID JAVIER   Administration


 


Pregabalin  75 mg  01/05/20 02:11  01/05/20 04:14





  Lyrica  PO   75 mg





  BID@0900,2100 JAVIER   Administration


 


Sertraline HCl  50 mg  01/03/20 09:00  01/04/20 09:06





  Zoloft  PO   50 mg





  DAILY@0900 JAVIER   Administration


 


Sucralfate  1 gm  01/03/20 17:30  01/04/20 20:45





  Carafate  PO   1 gm





  ACHS JAVIER   Administration








                                Intake and Output











 01/04/20 01/05/20 01/05/20





 22:59 06:59 14:59


 


Output Total  300 


 


Balance  -300 


 


Output:   


 


  Urine  300 


 


Other:   


 


  Voiding Method Diaper Diaper 


 


  # Voids 3  


 


  Weight  87 kg 








                                        





                                 01/05/20 05:51 





                                 01/05/20 05:51 











EKG Interpretations (text)





No EKG performed





Assessment and Plan


Plan: 


Assessment and plan


#1 progressive weakness and lethargy


#2 anemia, patient has chronic iron deficiency anemia, she takes iron at home.  

Status post EGD, patient on Protonix and sucralfate


#3 hypertension


#4 hyperlipidemia


#5 chronic diastolic congestive heart failure


#6 abnormality in troponin, not suggestive of acute coronary syndrome with no si

gnificant rise and fall pattern.  Patient denies having any chest discomfort.


#7 osteoarthritis


#8 GERD


#9 history of seizure disorder


#10 depression





Plan


We will obtain an echocardiogram with Doppler study.  Patient's most recent echo

performed in 2017 revealed a normal left ventricular systolic function.  We will

start the patient on a baby aspirin daily if okay with GI service, continue 

Norvasc, Lipitor, Lasix, hydralazine, lisinopril.  Further recommendations to 

follow.





DNP note has been reviewed, I agree with a documented findings and plan of care.

 Patient was seen and examined.

## 2020-01-05 NOTE — P.PN
Subjective


Progress Note Date: 01/05/20


Principal diagnosis: 





GI bleed


Elevated troponin level





Patient is a 69-year-old female with a known history of chronic CHF, 

cardiomegaly on follow-up with cardiology as an outpatient, hypertension, 

hyperlipidemia, osteoarthritis and depression who is currently staying get 

extended care facility was sent to [facility] initially due to patient

being very lethargic and drowsy and more sleepy than usual.  Patient was found 

to have a hemoglobin level IX.1 and dropped down to 8.6.  Patient had chest x-

ray showed no acute cardiopulmonary process.  Mild cardiomegaly.  BNP is not 

elevated at 82 and troponin level is 0.0-8.  Patient was found have bradycardic 

with heart rate in 40s when she was presented to [facility].


FOBT was positive.  Patient was transferred to [facility] due 

to possible GI bleed and further evaluation by gastroenterology. 


Patient had EGD today morning.  Hemoglobin was 8.9. patient is also found to 

have iron deficiency as per iron profile.


1.  LA grade D distal esophagitis, biopsied.


2.  Mild gastritis antrum and body, biopsied.


3.  Large hiatal hernia.


Currently patient denied any complaints of chest pain or worsening shortness of 

breath.  No nausea vomiting or abdominal pain.  Patient is more awake and 

oriented now.


Patient does take Dilaudid by mouth, fentanyl patch for pain management.  

Patient is also on Cymbalta at home.


Does have mild cognitive impairment.


Coreg 25 mg twice daily.  Which is held due to sinus bradycardia.





01/04/2020


Patient is complaining of nausea and episodes of vomiting today.  Denied any 

chest pain.  No diaphoresis or dizziness or lightheadedness..  Hemoglobin is 

trending up today.  Patient is being converted on Protonix and Carafate.  

Ferrous sulfate tablets were held due to nausea and vomiting.


Patient was found to have elevated troponin level 0.078 and 0.118.  Cardiology 

was consulted for evaluation.


Patient has been afebrile.  No cough or sputum production.





01/05/2020


Patient did improve symptomatically today.  No nausea or vomiting.  Was able to 

tolerate diet well today.


Patient was seen by cardiology due to elevated troponin level.  2-D 

echocardiogram was done LV function.  Troponin level is trending down.


Hemoglobin is 8.6 today.  Denied any dark-colored bowel movement.  Patient has 

been afebrile.  No cough or sputum production.


Anticipate discharge once cleared by cardiology and if the hemoglobin level is 

stable.  Patient is status post EGD due to GI bleed.





Active Medications





Hydrocodone Bitart/Acetaminophen (Norco 5-325)  1 each PO Q6HR PRN


   PRN Reason: Pain


   Last Admin: 01/04/20 20:51 Dose:  1 each


   Documented by: 


Albuterol/Ipratropium (Duoneb 0.5 Mg-3 Mg/3 Ml Soln)  3 ml INHALATION Q4HR PRN


   PRN Reason: Shortness Of Breath Or Wheezing


   Last Admin: 01/04/20 15:47 Dose:  3 ml


   Documented by: 


Alprazolam (Xanax)  0.25 mg PO TID@0900,1300,2100 [facility]


   Last Admin: 01/04/20 20:44 Dose:  0.25 mg


   Documented by: 


Amlodipine Besylate (Norvasc)  5 mg PO DAILY [facility]


Atorvastatin Calcium (Lipitor)  10 mg PO DAILY@2100 [facility]


   Last Admin: 01/04/20 20:44 Dose:  10 mg


   Documented by: 


Furosemide (Lasix)  40 mg PO DAILY [facility]


   Last Admin: 01/04/20 09:06 Dose:  40 mg


   Documented by: 


Guaifenesin (Robitussin)  200 mg PO Q6H PRN


   PRN Reason: Cough


   Last Admin: 01/03/20 16:48 Dose:  200 mg


   Documented by: 


Hydralazine HCl (Apresoline)  25 mg PO TID@0900,1300,2100 [facility]


   Last Admin: 01/04/20 20:44 Dose:  25 mg


   Documented by: 


Hydromorphone HCl (Dilaudid)  4 mg PO Q4HR PRN


   PRN Reason: Pain


   Last Admin: 01/04/20 12:03 Dose:  4 mg


   Documented by: 


Lisinopril (Zestril)  10 mg PO DAILY [facility]


   Last Admin: 01/04/20 09:06 Dose:  10 mg


   Documented by: 


Melatonin (Melatonin)  10 mg PO HS@2100 [facility]


   Last Admin: 01/04/20 20:44 Dose:  10 mg


   Documented by: 


Multivitamins (Theragran)  1 each PO DAILY@1300 [facility]


   Last Admin: 01/04/20 09:06 Dose:  1 each


   Documented by: 


Ondansetron HCl (Zofran)  4 mg PO Q6H PRN


   PRN Reason: Nausea


   Last Admin: 01/04/20 15:10 Dose:  4 mg


   Documented by: 


Oxcarbazepine (Trileptal)  600 mg PO BID@0900,1700 [facility]


   Last Admin: 01/04/20 17:50 Dose:  600 mg


   Documented by: 


Pantoprazole Sodium (Protonix)  40 mg PO AC-BID [facility]


   Last Admin: 01/04/20 15:10 Dose:  40 mg


   Documented by: 


Sertraline HCl (Zoloft)  50 mg PO DAILY@0900 [facility]


   Last Admin: 01/04/20 09:06 Dose:  50 mg


   Documented by: 


Sucralfate (Carafate)  1 gm PO ACHS [facility]


   Last Admin: 01/04/20 20:45 Dose:  1 gm


   Documented by: 











Objective





- Vital Signs


Vital signs: 


                                   Vital Signs











Temp  98.2 F   01/05/20 15:27


 


Pulse  69   01/05/20 16:00


 


Resp  18   01/05/20 16:00


 


BP  153/65   01/05/20 15:27


 


Pulse Ox  98   01/05/20 15:27








                                 Intake & Output











 01/05/20 01/05/20 01/06/20





 06:59 18:59 06:59


 


Output Total 300  


 


Balance -300  


 


Weight 87 kg  


 


Output:   


 


  Urine 300  


 


Other:   


 


  Voiding Method Diaper Diaper 


 


  # Voids 3  














- Exam





PHYSICAL EXAMINATION: 


Patient is lying in the bed comfortably, no acute distress, awake alert and or

iented.  Mild cognitive impairment. 


HEENT: Normocephalic. Neck is supple. Pupils reactive. Nostrils clear. Oral 

cavity is moist. Ears reveal no drainage. 


Neck reveals no JVD, carotid bruits, or thyromegaly. 


CHEST EXAMINATION: Trachea is central. Symmetrical expansion.  Bibasilar 

diminished air entry.  No wheezing.  Lung fields clear to auscultation and 

percussion. 


CARDIAC: Normal S1, S2 with no gallops. No murmurs 


ABDOMEN: Soft. Bowel sounds normal. No organomegaly. No abdominal bruits. 


Extremities: reveal no edema.  No clubbing or cyanosis


Neurologically awake, alert, oriented x3 with well-coordinated movements.  No 

focal deficits noted.  Mild dementia.


Skin: No rash or skin lesions. 


Psychiatric: Coperative.  Nonsuicidal


Musculoskeletal: No joint swelling or deformity.  Normal range of motion.





- Labs


CBC & Chem 7: 


                                 01/05/20 05:51





                                 01/05/20 05:51


Labs: 


                  Abnormal Lab Results - Last 24 Hours (Table)











  01/05/20 01/05/20 01/05/20 Range/Units





  01:03 05:51 05:51 


 


RBC   2.97 L   (3.80-5.40)  m/uL


 


Hgb   8.6 L   (11.4-16.0)  gm/dL


 


Hct   26.7 L   (34.0-46.0)  %


 


Lymphocytes #   0.8 L   (1.0-4.8)  k/uL


 


Sodium    134 L  (137-145)  mmol/L


 


Glucose    70 L  (74-99)  mg/dL


 


Calcium    8.1 L  (8.4-10.2)  mg/dL


 


Troponin I  0.076 H*    (0.000-0.034)  ng/mL














Assessment and Plan


Assessment: 





Generalized weakness and lethargy likely multifactorial due to anemia and now 

according pain medications.


Mild acute blood loss anemia likely GI bleed.  FOBT was positive.  Status post 

EGD.  Continue with Protonix and sucralfate.


Elevated troponin level possible demand mismatch.  Acute non-ST elevated MI 

cannot be excluded.


Chronic CHF with diastolic dysfunction.


Mild to moderate cardiomegaly


Hypertension


Hyperlipidemia


Osteoarthritis


GERD


History of CVA/TIA


History of seizure disorder


Depression


Chronic pain syndrome and polypharmacy


Mild dementia


DVT prophylaxis with heparin subcu





Plan:


Patient will be continued on PPI and sucralfate.  Monitor H&H.  Status post EGD.

 Follow biopsy report.  Symptomatic management for nausea and vomiting..


Coreg 25 mg twice a day, is on held due to sinus bradycardia.  Heart rate is 

improving now. 


Continue the home medications and limit narcotic pain medication use.


Gastroenterology is following.  Cardiology was consulted due to elevated 

troponin level.


Further recommendations based on the clinical course.  Prognosis is guarded.  

Discussed with her daughter at bedside in detail.








Time with Patient: Greater than 30

## 2020-01-06 RX ADMIN — SUCRALFATE SCH: 1 TABLET ORAL at 19:01

## 2020-01-06 RX ADMIN — ONDANSETRON PRN MG: 2 INJECTION INTRAMUSCULAR; INTRAVENOUS at 14:47

## 2020-01-06 RX ADMIN — SODIUM FERRIC GLUCONATE COMPLEX SCH MLS/HR: 12.5 INJECTION INTRAVENOUS at 09:35

## 2020-01-06 RX ADMIN — ONDANSETRON PRN MG: 2 INJECTION INTRAMUSCULAR; INTRAVENOUS at 09:28

## 2020-01-06 RX ADMIN — ONDANSETRON HYDROCHLORIDE PRN MG: 4 TABLET, FILM COATED ORAL at 09:02

## 2020-01-06 RX ADMIN — FUROSEMIDE SCH MG: 40 TABLET ORAL at 08:58

## 2020-01-06 RX ADMIN — PANTOPRAZOLE SODIUM SCH MG: 40 TABLET, DELAYED RELEASE ORAL at 06:39

## 2020-01-06 RX ADMIN — SUCRALFATE SCH GM: 1 TABLET ORAL at 20:41

## 2020-01-06 RX ADMIN — LISINOPRIL SCH MG: 10 TABLET ORAL at 08:58

## 2020-01-06 RX ADMIN — PREGABALIN SCH MG: 75 CAPSULE ORAL at 20:38

## 2020-01-06 RX ADMIN — Medication SCH MG: at 20:38

## 2020-01-06 RX ADMIN — THERA TABS SCH: TAB at 16:23

## 2020-01-06 RX ADMIN — PREGABALIN SCH MG: 75 CAPSULE ORAL at 08:58

## 2020-01-06 RX ADMIN — SUCRALFATE SCH GM: 1 TABLET ORAL at 06:39

## 2020-01-06 RX ADMIN — ATORVASTATIN CALCIUM SCH MG: 10 TABLET, FILM COATED ORAL at 20:37

## 2020-01-06 RX ADMIN — SUCRALFATE SCH: 1 TABLET ORAL at 16:23

## 2020-01-06 RX ADMIN — SERTRALINE HYDROCHLORIDE SCH MG: 50 TABLET, FILM COATED ORAL at 08:58

## 2020-01-06 RX ADMIN — ONDANSETRON HYDROCHLORIDE PRN MG: 4 TABLET, FILM COATED ORAL at 02:54

## 2020-01-06 RX ADMIN — ONDANSETRON PRN MG: 2 INJECTION INTRAMUSCULAR; INTRAVENOUS at 20:48

## 2020-01-06 RX ADMIN — PANTOPRAZOLE SODIUM SCH MG: 40 TABLET, DELAYED RELEASE ORAL at 16:30

## 2020-01-06 NOTE — ECHOF
Referral Reason:abn trop



MEASUREMENTS

--------

HEIGHT: 167.6 cm

WEIGHT: 86.2 kg

BP: 123/80

RVIDd:   3.1 cm     (< 3.3)

IVSd:   1.2 cm     (0.6 - 1.1)

LVIDd:   4.6 cm     (3.9 - 5.3)

LVPWd:   1.4 cm     (0.6 - 1.1)

IVSs:   1.8 cm

LVIDs:   3.2 cm

LVPWs:   1.9 cm

Ao Diam:   3.1 cm     (2.0 - 3.7)

AV Cusp:   1.9 cm     (1.5 - 2.6)

LA Diam:   3.6 cm     (2.7 - 3.8)

MV EXCURSION:   19.436 mm     (> 18.000)

MV EF SLOPE:   59 mm/s     (70 - 150)

EPSS:   0.6 cm

MV E Oz:   0.80 m/s

MV DecT:   212 ms

MV A Oz:   0.71 m/s

MV E/A Ratio:   1.12 

RAP:   5.00 mmHg

RVSP:   16.81 mmHg







FINDINGS

--------

Sinus rhythm.

This was a technically difficult study with suboptimal views.

The left ventricular size is normal.   There is mild concentric left ventricular hypertrophy.   Overa
ll left ventricular systolic function is normal with, an EF between 55 - 60 %.

The right ventricle is normal in size.

The left atrial size is normal.

The right atrial size is normal.

5.0mg of Lumason was utilized for enhancement of images

The aortic valve is trileaflet and appears structurally normal.

The mitral valve is normal.   The mitral valve leaflets are mildly thickened.   Mild mitral regurgita
tion is present.

The tricuspid valve appears structurally normal.   Mild tricuspid regurgitation present.   Right vent
ricular systolic pressure is normal at < 35 mmHg.

There is no pulmonic regurgitation present.

The aortic root size is normal.

IVC Not well visulized.

There is no pericardial effusion.



CONCLUSIONS

--------

1. Sinus rhythm.

2. This was a technically difficult study with suboptimal views.

3. The left ventricular size is normal.

4. There is mild concentric left ventricular hypertrophy.

5. Overall left ventricular systolic function is normal with, an EF between 55 - 60 %.

6. The right ventricle is normal in size.

7. The left atrial size is normal.

8. The right atrial size is normal.

9. 5.0mg of Lumason was utilized for enhancement of images

10. The aortic valve is trileaflet and appears structurally normal.

11. The mitral valve is normal.

12. The mitral valve leaflets are mildly thickened.

13. Mild mitral regurgitation is present.

14. The tricuspid valve appears structurally normal.

15. Mild tricuspid regurgitation present.

16. Right ventricular systolic pressure is normal at < 35 mmHg.

17. There is no pulmonic regurgitation present.

18. The aortic root size is normal.

19. IVC Not well visulized.

20. There is no pericardial effusion.





SONOGRAPHER: Mikala Verdugo RDCS

## 2020-01-06 NOTE — P.DS
Providers


Date of admission: 


01/02/20 23:01





Attending physician: 


Heber Juares





Consults: 





                                        





01/03/20 01:06


Consult Physician Urgent 


   Consulting Provider: Octavio Freed


   Consult Reason/Comments: GI bleed


   Do you want consulting provider notified?: Yes, Notify in am


   Placement Type Exists?: Yes





01/04/20 13:34


Consult Physician Routine 


   Consulting Provider: Ilya Hawkins


   Consult Reason/Comments: elevated Troponin


   Do you want consulting provider notified?: Yes











Primary care physician: 


Stated None





Hospital Course: 





69-year-old female with a known history of chronic CHF, cardiomegaly on follow-

up with cardiology as an outpatient, hypertension, hyperlipidemia, 

osteoarthritis and depression who is currently staying get extended care 

facility was sent to [facility] initially due to patient being very 

lethargic and drowsy and more sleepy than usual.  Patient was found to have a 

hemoglobin level IX.1 and dropped down to 8.6.  Patient had chest x-ray showed 

no acute cardiopulmonary process.  Mild cardiomegaly.  BNP is not elevated at 82

and troponin level is 0.0-8.  Patient was found have bradycardic with heart rate

in 40s when she was presented to [facility].


FOBT was positive.  Patient was transferred to [facility] due 

to possible GI bleed and further evaluation by gastroenterology. 


Patient had EGD today morning.  Hemoglobin was 8.9. patient is also found to 

have iron deficiency as per iron profile.


1.  LA grade D distal esophagitis, biopsied.


2.  Mild gastritis antrum and body, biopsied.


3.  Large hiatal hernia.


Currently patient denied any complaints of chest pain or worsening shortness of 

breath.  No nausea vomiting or abdominal pain.  Patient is more awake and 

oriented now.


Patient does take Dilaudid by mouth, fentanyl patch for pain management.  

Patient is also on Cymbalta at home.


Does have mild cognitive impairment.


Coreg 25 mg twice daily.  Which is held due to sinus bradycardia.





01/04/2020


Patient is complaining of nausea and episodes of vomiting today.  Denied any 

chest pain.  No diaphoresis or dizziness or lightheadedness..  Hemoglobin is 

trending up today.  Patient is being converted on Protonix and Carafate.  

Ferrous sulfate tablets were held due to nausea and vomiting.


Patient was found to have elevated troponin level 0.078 and 0.118.  Cardiology 

was consulted for evaluation.


Patient has been afebrile.  No cough or sputum production.





01/05/2020


Patient did improve symptomatically today.  No nausea or vomiting.  Was able to 

tolerate diet well today.


Patient was seen by cardiology due to elevated troponin level.  2-D 

echocardiogram was done LV function.  Troponin level is trending down.


Hemoglobin is 8.6 today.  Denied any dark-colored bowel movement.  Patient has 

been afebrile.  No cough or sputum production.


Anticipate discharge once cleared by cardiology and if the hemoglobin level is 

stable.  Patient is status post EGD due to GI bleed.








01/06/2020


Patient is still having nausea if her nausea improves patient will be discharged

today patient improved with Zofran she is able to tolerate the at least full 

liquid or soft diet patient will be discharged today.  Patient was evaluated by 

cardiology gastroneurology that upper GI endoscopy which showed esophagitis, 

gastritis, duodenitis and hiatal hernia.  Patient does have chronic pain 

syndrome is on the opiates for pain.  Patient has history of opiate abuse in the

past.  Patient's fentanyl will be continued Dilaudid will be discontinued and 

patient will be discharged if at all she improve symptomatically today on 

Cymbalta patient will be given Cymbalta and sertraline will be discontinued.  

Cymbalta will help with her depression as well as neuropathic pain and chronic 

pain.  Patient will benefit from outpatient evaluation by pain management speci

alist.











PHYSICAL EXAMINATION: 





GENERAL: The patient is alert and oriented x3, not in any acute distress. Well 

developed, well nourished. 


HEENT: Pupils are round and equally reacting to light. EOMI. No scleral icterus.

No conjunctival pallor. Normocephalic, atraumatic. No pharyngeal erythema. No 

thyromegaly. 


CARDIOVASCULAR: S1 and S2 present. No murmurs, rubs, or gallops. 


PULMONARY: Chest is clear to auscultation, no wheezing or crackles. 


ABDOMEN: Soft, nontender, nondistended, normoactive bowel sounds. No palpable 

organomegaly. 


MUSCULOSKELETAL: No joint swelling or deformity.


EXTREMITIES: No cyanosis, clubbing, or pedal edema. 


NEUROLOGICAL: Gross neurological examination did not reveal any focal deficits. 


SKIN: No rashes. 








Assessment and Plan


Assessment: 





Generalized weakness and lethargy likely multifactorial due to anemia and 

medication specifically opiates.  Patient remains on fentanyl patch with 

Dilaudid was discontinued will use tramadol for pain because of her acute GI 

issues and GI bleed we cannot use other nonsteroidal anti-inflammatories.  

Neuropathic pain we'll use Cymbalta as mentioned above


Mild acute blood loss anemia likely GI bleed.  FOBT was positive.  Status post 

EGD.  Continue with Protonix and sucralfate.


Elevated troponin level possible demand mismatch.  Acute non-ST elevated MI is 

ruled out


Chronic CHF with diastolic dysfunction.


Mild to moderate cardiomegaly


Hypertension


Hyperlipidemia


Osteoarthritis


GERD


History of CVA/TIA


History of seizure disorder


Depression


Chronic pain syndrome and polypharmacy


Mild dementia


-Sinus bradycardia secondary to beta blocker which was discontinued





Plan - Discharge Summary


Discharge Rx Participant: No


New Discharge Prescriptions: 


New


   Sucralfate [Carafate] 1 gm PO ACHS  tab


   amLODIPine [Norvasc] 5 mg PO DAILY #30 tab


   Pantoprazole [Protonix] 40 mg PO AC-BID  tablet.


   DULoxetine HCL [Cymbalta] 20 mg PO DAILY #10 capsule.





Continue


   OXcarbazepine [Trileptal] 600 mg PO BID@0900,1700


   Carvedilol [Coreg] 25 mg PO BID@0900,1700


   Ferrous Sulfate [Iron (65 MG Elemental)] 325 mg PO DAILY@0900


   hydrOXYzine HCL [Atarax] 25 mg PO BID@0900,1700


   Ondansetron [Zofran] 4 mg PO Q6H PRN


     PRN Reason: Nausea


   ALPRAZolam [Xanax] 0.25 mg PO TID@0900,1300,2100 #20 tab


   Malika-Lanta Susp 20 ml PO Q2H PRN


     PRN Reason: Indigestion


   Magnesium 400mg 400 mg PO BID@0900,2100


   Acetaminophen Tab [Tylenol] 500 mg PO Q6HR PRN


     PRN Reason: Pain


   Acetaminophen Tab [Tylenol] 500 mg PO DAILY@1300


   Bisacodyl 10 mg PO DAILY PRN


     PRN Reason: Constipation


   Bisacodyl [Dulcolax] 10 mg RECTAL DAILY PRN


     PRN Reason: Constipation


   DULoxetine HCL [Cymbalta] 60 mg PO DAILY@0900


   Furosemide [Lasix] 40 mg PO DAILY@1300


   Glycopyrrolate/Formoterol Fum [Bevespi Aerosphere Inhaler] 2 puff INHALATION 

RT-BID@0600,1700


   guaiFENesin [guaiFENesin Oral Solution] 200 mg PO Q4H PRN


     PRN Reason: Cough


   Ipratropium-Albuterol Nebulize [Duoneb 0.5 mg-3 mg/3 ml Soln] 3 ml INHALATION

RT-Q4H PRN


     PRN Reason: Shortness Of Breath Or Wheezing


   Ipratropium-Albuterol Nebulize [Duoneb 0.5 mg-3 mg/3 ml Soln] 3 ml INHALATION

RT-TID


   Lisinopril [Zestril] 10 mg PO DAILY@0900


   Magnesium Hydroxide [Milk of Magnesia] 2,400 mg PO DAILY PRN


     PRN Reason: Constipation


   Melatonin 10 mg PO HS@2100


   Multivitamins, Thera [Multivitamin (formulary)] 1 tab PO DAILY@1300


   Na Phos,M-B/Na Phos,Di-Ba [Fleet Adult] 133 ml RECTAL DAILY PRN


     PRN Reason: Constipation


   Nystatin 1 applic TOPICAL DAILY PRN


     PRN Reason: REDNESS


   Pregabalin [Lyrica] 75 mg PO BID@0900,2100


   Sennosides [Senokot] 17.2 mg PO DAILY@2100


   Sodium Chloride Tab 1 gm PO TID@0900,1300,2100


   Triamcinolone 0.1% Cream [Kenalog 0.1% Cream] 1 applicatio TOPICAL DAILY PRN


     PRN Reason: REDNESS


   fentaNYL 100MCG/HR PATCH [Duragesic 100MCG/HR] 100 mcg TRANSDERM Q72H #1 

patch





Discontinued


   Omeprazole [PriLOSEC] 20 mg PO Q48H


   hydrALAZINE HCL [Apresoline] 25 mg PO TID@0900,1300,2100


   HYDROmorphone HCL [Dilaudid] 4 mg PO Q4HR PRN #15 tablet


     PRN Reason: Pain


   Furosemide [Lasix] 80 mg PO DAILY@0600


   Naproxen 500 mg PO BID@0900,1700


Discharge Medication List





OXcarbazepine [Trileptal] 600 mg PO BID@0900,1700 07/08/15 [History]


Carvedilol [Coreg] 25 mg PO BID@0900,1700 09/14/17 [History]


Ferrous Sulfate [Iron (65 MG Elemental)] 325 mg PO DAILY@0900 12/19/17 [History]


Ondansetron [Zofran] 4 mg PO Q6H PRN 12/19/17 [History]


hydrOXYzine HCL [Atarax] 25 mg PO BID@0900,1700 12/19/17 [History]


ALPRAZolam [Xanax] 0.25 mg PO TID@0900,1300,2100 #20 tab 12/28/17 [Rx]


Acetaminophen Tab [Tylenol] 500 mg PO DAILY@1300 01/03/20 [History]


Acetaminophen Tab [Tylenol] 500 mg PO Q6HR PRN 01/03/20 [History]


Bisacodyl 10 mg PO DAILY PRN 01/03/20 [History]


Bisacodyl [Dulcolax] 10 mg RECTAL DAILY PRN 01/03/20 [History]


DULoxetine HCL [Cymbalta] 60 mg PO DAILY@0900 01/03/20 [History]


Furosemide [Lasix] 40 mg PO DAILY@1300 01/03/20 [History]


Malika-Lanta Susp 20 ml PO Q2H PRN 01/03/20 [History]


Glycopyrrolate/Formoterol Fum [Bevespi Aerosphere Inhaler] 2 puff INHALATION RT-

BID@0600,1700 01/03/20 [History]


Ipratropium-Albuterol Nebulize [Duoneb 0.5 mg-3 mg/3 ml Soln] 3 ml INHALATION 

RT-Q4H PRN 01/03/20 [History]


Ipratropium-Albuterol Nebulize [Duoneb 0.5 mg-3 mg/3 ml Soln] 3 ml INHALATION 

RT-TID 01/03/20 [History]


Lisinopril [Zestril] 10 mg PO DAILY@0900 01/03/20 [History]


Magnesium 400mg 400 mg PO BID@0900,2100 01/03/20 [History]


Magnesium Hydroxide [Milk of Magnesia] 2,400 mg PO DAILY PRN 01/03/20 [History]


Melatonin 10 mg PO HS@2100 01/03/20 [History]


Multivitamins, Thera [Multivitamin (formulary)] 1 tab PO DAILY@1300 01/03/20 

[History]


Na Phos,M-B/Na Phos,Di-Ba [Fleet Adult] 133 ml RECTAL DAILY PRN 01/03/20 

[History]


Nystatin 1 applic TOPICAL DAILY PRN 01/03/20 [History]


Pregabalin [Lyrica] 75 mg PO BID@0900,2100 01/03/20 [History]


Sennosides [Senokot] 17.2 mg PO DAILY@2100 01/03/20 [History]


Sodium Chloride Tab 1 gm PO TID@0900,1300,2100 01/03/20 [History]


Triamcinolone 0.1% Cream [Kenalog 0.1% Cream] 1 applicatio TOPICAL DAILY PRN 

01/03/20 [History]


guaiFENesin [guaiFENesin Oral Solution] 200 mg PO Q4H PRN 01/03/20 [History]


DULoxetine HCL [Cymbalta] 20 mg PO DAILY #10 capsule. 01/06/20 [Rx]


Pantoprazole [Protonix] 40 mg PO AC-BID  tablet. 01/06/20 [Rx]


Sucralfate [Carafate] 1 gm PO ACHS  tab 01/06/20 [Rx]


amLODIPine [Norvasc] 5 mg PO DAILY #30 tab 01/06/20 [Rx]


fentaNYL 100MCG/HR PATCH [Duragesic 100MCG/HR] 100 mcg TRANSDERM Q72H #1 patch 

01/06/20 [Rx]








Follow up Appointment(s)/Referral(s): 


Octavio Freed MD [STAFF PHYSICIAN] - 3 Weeks


Patient Instructions/Handouts:  Hiatal Hernia (DC), Diet for Stomach Ulcers and 

Gastritis (ED), Iron Deficiency Anemia (DC), Upper Endoscopy (DC)


Discharge Disposition: TRANSFER TO [facility]

## 2020-01-06 NOTE — P.PN
Subjective


Progress Note Date: 01/06/20





This is a 69-year-old  female with documented history of chronic 

diastolic congestive heart failure, she sees Dr. VC Lane in the office 

regularly, history of hypertension, hyperlipidemia, osteoarthritis, depression, 

chronic anemia for which she takes iron supplementation at home.  She resides at

an extended care facility and [facility], patient had been more weak and 

lethargic than usual, some blood work was drawn and she was found to have a low 

hemoglobin down to 7.9, she apparently also had a chest x-ray performed which 

did not reveal any acute pulmonary process.  Lab work was also performed at 

[facility] which revealed revealed a sodium 140, potassium 4.5, BUN 32, 

creatinine 1.4, white blood cell count 5.3, hemoglobin 7.9, platelet count 152. 

Blood pressure at [facility] 103/80 with a heart rate of 60, afebrile,

magnesium 2.8, troponin 0.0-4 UA negative.  BNP 82.  There is no EKG performed 

at [facility], there is also not one performed on arrival here.  Patient was 

transferred to [facility] for further evaluation and treatment.  Patient was seen 

by GI service on arrival here, she did undergo an EGD which revealed LA grade D 

distal esophagitis, biopsies, mild gastritis antrum and body, biopsy and large 

hiatal hernia, she was continued on Protonix.  Blood pressure here 178/74 with a

heart rate in the 60s, 100% on 2 L of oxygen.  White blood cell count is normal,

hemoglobin here is 8.6, platelet count 175.  Sodium 134, potassium 3.6, BUN 9 

and creatinine 0.6.  Troponins 0.078, 0.118, 0.076.  Iron 27, TIBC 229, iron 

saturation 11.7.  Patient denies having any chest discomfort and states that her

breathing has been quite stable, she is just been feeling very weak and having 

symptoms of nausea with intermittent vomiting at home.  She does complain of 

back pain this morning and states that she has problems with her sciatic nerve.








01/06/2020


Patient seen and examined this morning, overall doing okay, in general not 

feeling well, feels mildly weak and complaining of some mild back 

discomfort.blood pressure 123/80 with a heart rate in the 80s, 98% on room 

air.White blood cell count 3.8, hemoglobin 8.6, platelet count 175, sodium 134, 

potassium 3.6, BUN 9, creatinine 0.6.





Objective





- Vital Signs


Vital signs: 


                                   Vital Signs











Temp  98 F   01/06/20 08:00


 


Pulse  61   01/06/20 08:00


 


Resp  20   01/06/20 08:00


 


BP  193/80   01/06/20 08:00


 


Pulse Ox  96   01/06/20 08:00








                                 Intake & Output











 01/05/20 01/06/20 01/06/20





 18:59 06:59 18:59


 


Output Total  100 


 


Balance  -100 


 


Weight  86.5 kg 


 


Output:   


 


  Urine  100 


 


Other:   


 


  Voiding Method Diaper Diaper 


 


  # Voids  1 














- Exam





PHYSICAL EXAMINATION: 





GENERAL: 69-year-old  female in no acute distress at the time of my 

examination





HEENT: Head is atraumatic, normocephalic.  Pupils equal, round.  Sclera 

anicteric. Conjunctiva are clear.  Mucous membranes of the mouth are moist.  

Neck is supple.  There is no elevated jugular venous pressure.  No carotid  

bruit is heard.





HEART EXAMINATION: S1 and S2 systolic murmur is heard





CHEST EXAMINATION: Lungs are clear to auscultation and precussion. No chest wall

tenderness is noted on palpation or with deep breathing.





ABDOMEN:  Soft, mild generalized tenderness . Bowel sounds are heard. No 

organomegaly noted.


 


EXTREMITIES: 2+ peripheral pulses with no evidence of peripheral edema and no 

calf tenderness noted.





NEUROLOGIC patient is awake, alert and oriented 3 .


 





- Labs


CBC & Chem 7: 


                                 01/05/20 05:51





                                 01/05/20 05:51





Assessment and Plan


Plan: 


Assessment and plan


#1 progressive weakness and lethargy


#2 anemia, patient has chronic iron deficiency anemia, she takes iron at home.  

Status post EGD, patient on Protonix and sucralfate


#3 hypertension


#4 hyperlipidemia


#5 chronic diastolic congestive heart failure


#6 abnormality in troponin, not suggestive of acute coronary syndrome with no 

significant rise and fall pattern.  Patient denies having any chest discomfort.


#7 osteoarthritis


#8 GERD


#9 history of seizure disorder


#10 depression





Plan


We will review the echocardiogram with Doppler study.  Patient's most recent 

echo performed in 2017 revealed a normal left ventricular systolic function.  

continue current medications.





DNP note has been reviewed, I agree with a documented findings and plan of care.

 Patient was seen and examined.

## 2020-01-06 NOTE — PN
PROGRESS NOTE



DATE OF DICTATION:

January 06, 2020



Patient is a 69-year-old pleasant white female admitted to hospital with severe back

pain and nausea and vomiting.  She had an upper endoscopy done by Dr. Freed 3 days

ago, which showed LA Grade reflux esophagitis, mild antral gastritis and a large hiatal

hernia.  Patient since then has been on Protonix 40 mg twice daily as well as Carafate.

She is feeling much better.  However, she has started having severe back pain this

morning, which triggered the nausea and had about 3 episodes of emesis this morning.

She denies any abdominal pain.  She reports no heartburn.  Denies any dysphagia or

odynophagia.



PHYSICAL EXAMINATION:

Appears comfortable in no apparent distress.

VITAL SIGNS:  Stable.  Blood pressure 166/72, pulse is 60, temperature 97.9.

HEENT examination unremarkable. Conjunctivae pink.  Sclerae  anicteric.  Oral cavity no

lesions.

NECK: No JVD or lymph node enlargement.

CHEST: Clear to auscultation.

HEART:  Regular rate and rhythm.

ABDOMEN:  Soft.  Bowel sounds are positive.  No organomegaly.  EXTREMITIES:  No pedal

edema.  SKIN no rashes.

NEUROLOGIC:  Alert and oriented x3.  No focal deficits.



LABS:

WBC 3.8, hemoglobin 8.6, platelets normal.  BUN and creatinine are within normal

limits.



IMPRESSION:

1. Nausea, vomiting for the last few days duration, status post EGD by Dr. Freed 3

    days ago which revealed severe LA grade D reflux esophagitis and large hiatal

    hernia.

2. Chronic back pain which is contributing to some nausea and vomiting.



RECOMMENDATIONS:

1. Continue with Protonix 40 mg twice daily.

2. Small frequent meals.

3. Carafate as needed.

4. Antiemetics as needed.

5. We will follow her closely during hospital stay.

Thank you for this consultation.





MMODL / IJN: [id number] / Job#: [id number]

## 2020-01-07 VITALS
SYSTOLIC BLOOD PRESSURE: 192 MMHG | DIASTOLIC BLOOD PRESSURE: 86 MMHG | TEMPERATURE: 98 F | HEART RATE: 63 BPM | RESPIRATION RATE: 16 BRPM

## 2020-01-07 LAB
ANION GAP SERPL CALC-SCNC: 3 MMOL/L
BUN SERPL-SCNC: 3 MG/DL (ref 7–17)
CALCIUM SPEC-MCNC: 7.9 MG/DL (ref 8.4–10.2)
CHLORIDE SERPL-SCNC: 98 MMOL/L (ref 98–107)
CO2 SERPL-SCNC: 31 MMOL/L (ref 22–30)
ERYTHROCYTE [DISTWIDTH] IN BLOOD BY AUTOMATED COUNT: 2.92 M/UL (ref 3.8–5.4)
ERYTHROCYTE [DISTWIDTH] IN BLOOD: 15 % (ref 11.5–15.5)
GLUCOSE SERPL-MCNC: 84 MG/DL (ref 74–99)
HCT VFR BLD AUTO: 25.9 % (ref 34–46)
HGB BLD-MCNC: 8.6 GM/DL (ref 11.4–16)
MCH RBC QN AUTO: 29.5 PG (ref 25–35)
MCHC RBC AUTO-ENTMCNC: 33.4 G/DL (ref 31–37)
MCV RBC AUTO: 88.5 FL (ref 80–100)
PLATELET # BLD AUTO: 233 K/UL (ref 150–450)
POTASSIUM SERPL-SCNC: 3.1 MMOL/L (ref 3.5–5.1)
SODIUM SERPL-SCNC: 132 MMOL/L (ref 137–145)
WBC # BLD AUTO: 4.5 K/UL (ref 3.8–10.6)

## 2020-01-07 RX ADMIN — LISINOPRIL SCH MG: 10 TABLET ORAL at 08:36

## 2020-01-07 RX ADMIN — PREGABALIN SCH MG: 75 CAPSULE ORAL at 08:20

## 2020-01-07 RX ADMIN — THERA TABS SCH EACH: TAB at 08:20

## 2020-01-07 RX ADMIN — ONDANSETRON PRN MG: 2 INJECTION INTRAMUSCULAR; INTRAVENOUS at 13:54

## 2020-01-07 RX ADMIN — ONDANSETRON PRN MG: 2 INJECTION INTRAMUSCULAR; INTRAVENOUS at 08:13

## 2020-01-07 RX ADMIN — SODIUM FERRIC GLUCONATE COMPLEX SCH MLS/HR: 12.5 INJECTION INTRAVENOUS at 12:16

## 2020-01-07 RX ADMIN — PANTOPRAZOLE SODIUM SCH MG: 40 TABLET, DELAYED RELEASE ORAL at 08:19

## 2020-01-07 RX ADMIN — SUCRALFATE SCH GM: 1 TABLET ORAL at 12:16

## 2020-01-07 RX ADMIN — FUROSEMIDE SCH MG: 40 TABLET ORAL at 08:21

## 2020-01-07 RX ADMIN — SUCRALFATE SCH GM: 1 TABLET ORAL at 08:19

## 2020-01-07 NOTE — P.DS
Providers


Date of admission: 


01/02/20 23:01





Attending physician: 


Heber Juares





Consults: 





                                        





01/03/20 01:06


Consult Physician Urgent 


   Consulting Provider: Octavio Freed


   Consult Reason/Comments: GI bleed


   Do you want consulting provider notified?: Yes, Notify in am


   Placement Type Exists?: Yes





01/04/20 13:34


Consult Physician Routine 


   Consulting Provider: Ilya Hawkins


   Consult Reason/Comments: elevated Troponin


   Do you want consulting provider notified?: Yes











Primary care physician: 


Stated None





Hospital Course: 








69-year-old female with a known history of chronic CHF, cardiomegaly on follow-

up with cardiology as an outpatient, hypertension, hyperlipidemia, 

osteoarthritis and depression who is currently staying get extended care 

facility was sent to [facility] initially due to patient being very 

lethargic and drowsy and more sleepy than usual.  Patient was found to have a 

hemoglobin level IX.1 and dropped down to 8.6.  Patient had chest x-ray showed 

no acute cardiopulmonary process.  Mild cardiomegaly.  BNP is not elevated at 82

and troponin level is 0.0-8.  Patient was found have bradycardic with heart rate

in 40s when she was presented to [facility].


FOBT was positive.  Patient was transferred to [facility] due 

to possible GI bleed and further evaluation by gastroenterology. 


Patient had EGD today morning.  Hemoglobin was 8.9. patient is also found to 

have iron deficiency as per iron profile.


1.  LA grade D distal esophagitis, biopsied.


2.  Mild gastritis antrum and body, biopsied.


3.  Large hiatal hernia.


Currently patient denied any complaints of chest pain or worsening shortness of 

breath.  No nausea vomiting or abdominal pain.  Patient is more awake and 

oriented now.


Patient does take Dilaudid by mouth, fentanyl patch for pain management.  

Patient is also on Cymbalta at home.


Does have mild cognitive impairment.


Coreg 25 mg twice daily.  Which is held due to sinus bradycardia.





01/04/2020


Patient is complaining of nausea and episodes of vomiting today.  Denied any 

chest pain.  No diaphoresis or dizziness or lightheadedness..  Hemoglobin is 

trending up today.  Patient is being converted on Protonix and Carafate.  

Ferrous sulfate tablets were held due to nausea and vomiting.


Patient was found to have elevated troponin level 0.078 and 0.118.  Cardiology 

was consulted for evaluation.


Patient has been afebrile.  No cough or sputum production.





01/05/2020


Patient did improve symptomatically today.  No nausea or vomiting.  Was able to 

tolerate diet well today.


Patient was seen by cardiology due to elevated troponin level.  2-D 

echocardiogram was done LV function.  Troponin level is trending down.


Hemoglobin is 8.6 today.  Denied any dark-colored bowel movement.  Patient has 

been afebrile.  No cough or sputum production.


Anticipate discharge once cleared by cardiology and if the hemoglobin level is 

stable.  Patient is status post EGD due to GI bleed.








01/06/2020


Patient is still having nausea if her nausea improves patient will be discharged

today patient improved with Zofran she is able to tolerate the at least full 

liquid or soft diet patient will be discharged today.  Patient was evaluated by 

cardiology gastroneurology that upper GI endoscopy which showed esophagitis, 

gastritis, duodenitis and hiatal hernia.  Patient does have chronic pain 

syndrome is on the opiates for pain.  Patient has history of opiate abuse in the

past.  Patient's fentanyl will be continued Dilaudid will be discontinued and 

patient will be discharged if at all she improve symptomatically today on 

Cymbalta patient will be given Cymbalta and sertraline will be discontinued.  

Cymbalta will help with her depression as well as neuropathic pain and chronic 

pain.  Patient will benefit from outpatient evaluation by pain management spec

ialist.





01/07/2020


Patient is bit hyponatremic secondary to nausea vomiting yesterday and being on 

Lasix.  Patient will hold her Lasix tomorrow basic metabolic profile will be 

obtained in 2 days.  Increasing the dose of lisinopril











PHYSICAL EXAMINATION: 





GENERAL: The patient is alert and oriented x3, not in any acute distress. Well 

developed, well nourished. 


HEENT: Pupils are round and equally reacting to light. EOMI. No scleral icterus.

No conjunctival pallor. Normocephalic, atraumatic. No pharyngeal erythema. No 

thyromegaly. 


CARDIOVASCULAR: S1 and S2 present. No murmurs, rubs, or gallops. 


PULMONARY: Chest is clear to auscultation, no wheezing or crackles. 


ABDOMEN: Soft, nontender, nondistended, normoactive bowel sounds. No palpable 

organomegaly. 


MUSCULOSKELETAL: No joint swelling or deformity.


EXTREMITIES: No cyanosis, clubbing, or pedal edema. 


NEUROLOGICAL: Gross neurological examination did not reveal any focal deficits. 


SKIN: No rashes. 








Assessment and Plan


Assessment: 





Generalized weakness and lethargy likely multifactorial due to anemia and 

medication specifically opiates.  Patient remains on fentanyl patch with 

Dilaudid was discontinued will use tramadol for pain because of her acute GI 

issues and GI bleed we cannot use other nonsteroidal anti-inflammatories.  

Neuropathic pain we'll use Cymbalta as mentioned above


Mild acute blood loss anemia likely GI bleed.  FOBT was positive.  Status post 

EGD.  Continue with Protonix and sucralfate.


Elevated troponin level possible demand mismatch.  Acute non-ST elevated MI is 

ruled out


Chronic CHF with diastolic dysfunction.


Mild to moderate cardiomegaly


Hypertension


Hyperlipidemia


Osteoarthritis


GERD


History of CVA/TIA


History of seizure disorder


Depression


Chronic pain syndrome and polypharmacy


Mild dementia


-Sinus bradycardia secondary to beta blocker which was discontinued








Plan - Discharge Summary


Discharge Rx Participant: No


New Discharge Prescriptions: 


New


   Sucralfate [Carafate] 1 gm PO ACHS  tab


   amLODIPine [Norvasc] 5 mg PO DAILY #30 tab


   Pantoprazole [Protonix] 40 mg PO AC-BID  tablet.


   DULoxetine HCL [Cymbalta] 20 mg PO DAILY #10 capsule.





Continue


   OXcarbazepine [Trileptal] 600 mg PO BID@0900,1700


   Ferrous Sulfate [Iron (65 MG Elemental)] 325 mg PO DAILY@0900


   hydrOXYzine HCL [Atarax] 25 mg PO BID@0900,1700


   Ondansetron [Zofran] 4 mg PO Q6H PRN


     PRN Reason: Nausea


   ALPRAZolam [Xanax] 0.25 mg PO TID@0900,1300,2100 #20 tab


   Malika-Lanta Susp 20 ml PO Q2H PRN


     PRN Reason: Indigestion


   Magnesium 400mg 400 mg PO BID@0900,2100


   Acetaminophen Tab [Tylenol] 500 mg PO Q6HR PRN


     PRN Reason: Pain


   Acetaminophen Tab [Tylenol] 500 mg PO DAILY@1300


   Bisacodyl 10 mg PO DAILY PRN


     PRN Reason: Constipation


   Bisacodyl [Dulcolax] 10 mg RECTAL DAILY PRN


     PRN Reason: Constipation


   DULoxetine HCL [Cymbalta] 60 mg PO DAILY@0900


   Furosemide [Lasix] 40 mg PO DAILY@1300


   Glycopyrrolate/Formoterol Fum [Bevespi Aerosphere Inhaler] 2 puff INHALATION 

RT-BID@0600,1700


   guaiFENesin [guaiFENesin Oral Solution] 200 mg PO Q4H PRN


     PRN Reason: Cough


   Ipratropium-Albuterol Nebulize [Duoneb 0.5 mg-3 mg/3 ml Soln] 3 ml INHALATION

RT-Q4H PRN


     PRN Reason: Shortness Of Breath Or Wheezing


   Ipratropium-Albuterol Nebulize [Duoneb 0.5 mg-3 mg/3 ml Soln] 3 ml INHALATION

RT-TID


   Magnesium Hydroxide [Milk of Magnesia] 2,400 mg PO DAILY PRN


     PRN Reason: Constipation


   Melatonin 10 mg PO HS@2100


   Multivitamins, Thera [Multivitamin (formulary)] 1 tab PO DAILY@1300


   Na Phos,M-B/Na Phos,Di-Ba [Fleet Adult] 133 ml RECTAL DAILY PRN


     PRN Reason: Constipation


   Nystatin 1 applic TOPICAL DAILY PRN


     PRN Reason: REDNESS


   Pregabalin [Lyrica] 75 mg PO BID@0900,2100


   Sennosides [Senokot] 17.2 mg PO DAILY@2100


   Sodium Chloride Tab 1 gm PO TID@0900,1300,2100


   Triamcinolone 0.1% Cream [Kenalog 0.1% Cream] 1 applicatio TOPICAL DAILY PRN


     PRN Reason: REDNESS


   fentaNYL 100MCG/HR PATCH [Duragesic 100MCG/HR] 100 mcg TRANSDERM Q72H #1 

patch





Changed


   Lisinopril [Zestril] 20 mg PO DAILY@0900 #0





Discontinued


   Omeprazole [PriLOSEC] 20 mg PO Q48H


   Carvedilol [Coreg] 25 mg PO BID@0900,1700


   hydrALAZINE HCL [Apresoline] 25 mg PO TID@0900,1300,2100


   HYDROmorphone HCL [Dilaudid] 4 mg PO Q4HR PRN #15 tablet


     PRN Reason: Pain


   Furosemide [Lasix] 80 mg PO DAILY@0600


   Naproxen 500 mg PO BID@0900,1700


Discharge Medication List





OXcarbazepine [Trileptal] 600 mg PO BID@0900,1700 07/08/15 [History]


Ferrous Sulfate [Iron (65 MG Elemental)] 325 mg PO DAILY@0900 12/19/17 [History]


Ondansetron [Zofran] 4 mg PO Q6H PRN 12/19/17 [History]


hydrOXYzine HCL [Atarax] 25 mg PO BID@0900,1700 12/19/17 [History]


ALPRAZolam [Xanax] 0.25 mg PO TID@0900,1300,2100 #20 tab 12/28/17 [Rx]


Acetaminophen Tab [Tylenol] 500 mg PO DAILY@1300 01/03/20 [History]


Acetaminophen Tab [Tylenol] 500 mg PO Q6HR PRN 01/03/20 [History]


Bisacodyl 10 mg PO DAILY PRN 01/03/20 [History]


Bisacodyl [Dulcolax] 10 mg RECTAL DAILY PRN 01/03/20 [History]


DULoxetine HCL [Cymbalta] 60 mg PO DAILY@0900 01/03/20 [History]


Furosemide [Lasix] 40 mg PO DAILY@1300 01/03/20 [History]


Malika-Lanta Susp 20 ml PO Q2H PRN 01/03/20 [History]


Glycopyrrolate/Formoterol Fum [Bevespi Aerosphere Inhaler] 2 puff INHALATION RT-

BID@0600,1700 01/03/20 [History]


Ipratropium-Albuterol Nebulize [Duoneb 0.5 mg-3 mg/3 ml Soln] 3 ml INHALATION 

RT-Q4H PRN 01/03/20 [History]


Ipratropium-Albuterol Nebulize [Duoneb 0.5 mg-3 mg/3 ml Soln] 3 ml INHALATION 

RT-TID 01/03/20 [History]


Magnesium 400mg 400 mg PO BID@0900,2100 01/03/20 [History]


Magnesium Hydroxide [Milk of Magnesia] 2,400 mg PO DAILY PRN 01/03/20 [History]


Melatonin 10 mg PO HS@2100 01/03/20 [History]


Multivitamins, Thera [Multivitamin (formulary)] 1 tab PO DAILY@1300 01/03/20 

[History]


Na Phos,M-B/Na Phos,Di-Ba [Fleet Adult] 133 ml RECTAL DAILY PRN 01/03/20 

[History]


Nystatin 1 applic TOPICAL DAILY PRN 01/03/20 [History]


Pregabalin [Lyrica] 75 mg PO BID@0900,2100 01/03/20 [History]


Sennosides [Senokot] 17.2 mg PO DAILY@2100 01/03/20 [History]


Sodium Chloride Tab 1 gm PO TID@0900,1300,2100 01/03/20 [History]


Triamcinolone 0.1% Cream [Kenalog 0.1% Cream] 1 applicatio TOPICAL DAILY PRN 

01/03/20 [History]


guaiFENesin [guaiFENesin Oral Solution] 200 mg PO Q4H PRN 01/03/20 [History]


DULoxetine HCL [Cymbalta] 20 mg PO DAILY #10 capsule. 01/06/20 [Rx]


Pantoprazole [Protonix] 40 mg PO AC-BID  tablet. 01/06/20 [Rx]


Sucralfate [Carafate] 1 gm PO ACHS  tab 01/06/20 [Rx]


amLODIPine [Norvasc] 5 mg PO DAILY #30 tab 01/06/20 [Rx]


fentaNYL 100MCG/HR PATCH [Duragesic 100MCG/HR] 100 mcg TRANSDERM Q72H #1 patch 

01/06/20 [Rx]


Lisinopril [Zestril] 20 mg PO DAILY@0900 #0 01/07/20 [Rx]








Follow up Appointment(s)/Referral(s): 


Octavio Freed MD [STAFF PHYSICIAN] - 3 Weeks


Patient Instructions/Handouts:  Hiatal Hernia (DC), Diet for Stomach Ulcers and 

Gastritis (ED), Iron Deficiency Anemia (DC), Upper Endoscopy (DC)


Activity/Diet/Wound Care/Special Instructions: 


Hold off on lasix tomorrow, BMP in 3 days


Discharge Disposition: TRANSFER TO SNF/F

## 2020-01-07 NOTE — PN
PROGRESS NOTE



DATE OF SERVICE:

01/07/2020



REQUESTING PHYSICIAN:

Dr. Juares.



The patient is a 69-year-old pleasant white female admitted to hospital with nausea,

vomiting, and underwent an upper endoscopy by Dr. Freed last Friday that showed

evidence of severe LA grade D reflux esophagitis, mild gastritis and a large hiatal

hernia.  She was doing well this morning.  Her diet was advanced to a regular diet.

Immediately after she had her meal she had a large emesis followed by diarrhea.  She is

also now complaining of severe back pain.



PHYSICAL EXAMINATION:

She is somewhat uncomfortable because of the back pain.

VITAL SIGNS:  Stable.  Blood pressure is 192/86, pulse is 63, temperature 98.

HEENT:  Unremarkable.  Conjunctivae pink.  Sclerae anicteric.  Oral cavity, no lesions.

NECK:  No JVD or lymph node enlargement.

CHEST:  Clear to auscultation.

HEART:  Regular rate and rhythm.

ABDOMEN:  Soft, nontender.  Bowel sounds are positive.

EXTREMITIES:  No pedal edema.

SKIN:  No rashes.

NEUROLOGIC:  Alert and oriented x3.  No focal deficits.



LABS:

WBC 4.5, hemoglobin 8.6, platelets 233.  Basic metabolic panel is within normal limits.



IMPRESSION:

1. Epigastric pain associated with nausea and vomiting, status post EGD by Dr. Freed

    3 days ago that showed severe reflux esophagitis.  Presently on Protonix 40 mg

    daily.

2. Severe chronic back pain causing intermittent episodes of nausea and vomiting.

3. History of gastroesophageal reflux disease.  Presently on Carafate and Protonix 40

    mg daily.



RECOMMENDATIONS:

1. Continue with Protonix 40 mg twice daily.

2. Continue with Carafate 1 g 4 times daily.

3. Small frequent meals.

4. We will continue her Zofran as needed.  We will follow with you closely.

Thank you for this consultation.





MMODL / IJN: [id number] / Job#: [id number]

## 2022-11-10 NOTE — P.PN
Subjective





67-year-old female patient is being seen in follow-up regarding her bronchial 

asthma.  The patient is doing well and she has no specific respiratory 

difficulties.  She is resting comfortably in bed.  No cough or sputum 

production.  No chest tightness or wheezing.  No pleurisy.  No hemoptysis.  She 

is hospitalized for some chest pain and she is under the care of cardiology and 

medicine.  No active angina or chest pain at this point.  No palpitations.  The 

patient also has a history of congestion heart failure which is currently 

inactive in stable and it well compensated for now.  She runs a lower sodium 

level and her most recent sodium level is at 129 and this is probably drug-

induced knowing that several medications the patient takes for seizure could've 

contributed to this lower sodium level.  In any rate, no change in mental 

status.  No seizure activity.  No other complaints otherwise for now.  In 

regards to her bronchial asthma, the patient is on Symbicort pH is also on 

daily Lasix 20 mg by mouth twice a day.  She is completing a course of 

prednisone burst taper treating an acute asthma exacerbation that was diagnosed 

at a time of her admission.





On 9 19,017 the patient is stable.  The patient not having any major 

respiratory difficulties.  She is calm and comfortable perintake and cough 

sputum production chest that is so wheezing.  She will be taken off this IV Solu

-Medrol and the patient was placed on a prednisone burst taper.  No headaches.  

No change in mental status patient is resting comfortably in bed.  No 

hemoptysis.  No pleurisy.  Cardiology is also on the case.





On 09/20/2017 the patient is being seen in follow-up.  She is stable and she 

has no specific complaints.  The patient is free of any seizures.  The 

antiepileptic levels are being monitored by neurology.  The follow-up sodium 

level is at 133.  The rest of the electrodes are all within normal limits.  

Meanwhile, the patient is also completing a prednisone burst taper and the 

patient on Symbicort as maintenance for bronchial asthma.  DuoNeb is being 

given around-the-clock.





Objective





- Vital Signs


Vital signs: 


 Vital Signs











Temp  97.2 F L  09/20/17 12:00


 


Pulse  68   09/20/17 12:13


 


Resp  18   09/20/17 12:00


 


BP  88/53   09/20/17 12:00


 


Pulse Ox  95   09/20/17 12:00








 Intake & Output











 09/19/17 09/20/17 09/20/17





 18:59 06:59 18:59


 


Intake Total 120  360


 


Output Total 700 300 


 


Balance -580 -300 360


 


Weight  87.5 kg 


 


Intake:   


 


  Oral 120  360


 


Output:   


 


  Urine 700 300 


 


Other:   


 


  Voiding Method Bedpan Bedpan Bedpan


 


  # Voids 1 1 2


 


  # Bowel Movements   2














- Exam





Head exam was generally normal. There was no scleral icterus or corneal arcus. 

Mucous membranes were moist.Neck was supple and without jugular venous 

distension, thyromegaly, or carotid bruits. Carotids were easily palpable 

bilaterally. There was no adenopathy.  Lung sounds are diminished bilaterally.  

No wheezes overall currently crackles.Cardiac exam revealed the PMI to be 

normally situated and sized. The rhythm was regular and no extrasystoles were 

noted during several minutes of auscultation. The first and second heart sounds 

were normal and physiologic splitting of the second heart sound was noted. 

There were no murmurs, rubs, clicks, or gallops.Abdominal exam revealed normal 

bowel sounds. The abdomen was soft, non-tender, and without masses, organomegaly

, or appreciable enlargement of the abdominal aorta.Examination of the 

extremities revealed easily palpable radial, femoral and pedal pulses. There 

was no cyanosis, clubbing or edema.  Neurologically the patient is alert and 

awake and she is following commands and answering questions appropriately.  

Skin examination is within normal limits and there is no wounds or rashes of 

any ulceration.  Skeletal exam is within normal.  Psychiatric exam is within 

normal limits and the patient is currently on Abilify for depression.





- Labs


CBC & Chem 7: 


 09/14/17 22:41





 09/20/17 05:50


Labs: 


 Abnormal Lab Results - Last 24 Hours (Table)











  09/19/17 09/19/17 09/20/17 Range/Units





  16:29 20:42 05:50 


 


Sodium    133 L  (137-145)  mmol/L


 


Creatinine    0.48 L  (0.52-1.04)  mg/dL


 


POC Glucose (mg/dL)  135 H  110 H   (75-99)  mg/dL














  09/20/17 Range/Units





  11:33 


 


Sodium   (137-145)  mmol/L


 


Creatinine   (0.52-1.04)  mg/dL


 


POC Glucose (mg/dL)  117 H  (75-99)  mg/dL














Assessment and Plan


Plan: 





Assessment





1 acute bronchial asthma exacerbation, improving and the patient has improved 

significantly over the past 24 hours and currently she is on Symbicort 

maintenance and completing a prednisone burst taper.





On 09/19/2017 the patient is improving.  The patient is currently on a 

prednisone burst taper.





On 09/20/2017 the patient has recovered from her acute asthma exacerbation and 

she has no respiratory difficulties at all.  Symbicort as maintenance.  

Prednisone burst taper is being continued.





2 CVA, history of





3 seizure disorder, history of currently inactive in stable





4 chronic hyponatremia with a sodium level of 129, probably drug-induced and 

the sodium level is also improved 132.





5 CHF or compensated for now, and the echocardiogram showed no evidence of any 

significant pulmonary hypertension and the patient has mild concentric left 

ventricular hypertrophy with an ejection fraction of 55-60%.  No significant 

valvular heart disease.





6 chest pain currently under investigation, likely atypical





7 hypertension





8 hyperlipidemia





9 coronary artery disease with previous history of cardiac catheterization and 

she has disease involving the RCA.  





Plan





Complete the course of prednisone burst taper.  Suggest a 12 day course with 10 

mg taper every 3 days.  Continue Symbicort as maintenance.  Condition is 

stable.  Discharge planning.  Pulmonary we'll sign off. Scribe Attestation (For Scribes USE Only)... Attending Attestation (For Attendings USE Only).../Scribe Attestation (For Scribes USE Only)...